# Patient Record
Sex: FEMALE | Race: WHITE
[De-identification: names, ages, dates, MRNs, and addresses within clinical notes are randomized per-mention and may not be internally consistent; named-entity substitution may affect disease eponyms.]

---

## 2016-12-29 NOTE — DIAGNOSTIC IMAGING REPORT
Indication: PAIN



Technique: One view of the chest



Comparison: none



Findings: Inspiration is suboptimal There is mild generalized interstitial

prominence. Patient is rotated to the right. No focal airspace consolidation. Heart

size is borderline enlarged. There are degenerative changes of the right shoulder



Impression: Bilateral mild interstitial disease, acuity indeterminate. Correlate

with clinical findings



Other findings as noted

## 2016-12-29 NOTE — EMERGENCY ROOM REPORT
History of Present Illness


General


Chief Complaint:  Multiple Trauma/Fall


Source:  Caregiver





Present Illness


HPI


83 YO F DRAGAN, sent by PMD Dr Kaplan for failure to thrive and weakness. 

Patient's daughter endorses increased difficulty caring for mother - has ?

dementia - is weak, cant ambulate for "some time." States they were trying to 

change her and she slid off couch but didnt fall hard or hurt anything on the 

fall.  Denies any pain now.  Patient's daughter also endorses decreased appetite

, not eating/drinking normally.  Patient's  has "broken leg" and its 

been difficult for daughter to care for both of them at her house.  patient 

also refusing to take her medication for HTN, thyroid, "memory pil;l."





I disagree with RN triage note that patient is here for "Fall" with back pain.  

Dr Kaplan called ED yesterday to let us know he was sending patient for 

admission for failure to thrive and weakness.


Allergies:  


Coded Allergies:  


     No Known Allergies (Unverified , 12/29/16)





Patient History


Past Medical History:  other - HTN, "thyroid problem," ?dementia


Past Surgical History:  none


Pertinent Family History:  none


Social History:  Denies: alcohol use, drug use, smoking


Pregnant Now:  No


Immunizations:  UTD





Nursing Documentation-PMH


Hx Cardiac Problems:  No


Hx Hypertension:  Yes


Hx Pacemaker:  No


Hx Asthma:  No


Hx COPD:  No


Hx Diabetes:  No


Hx Cancer:  No


Hx Gastrointestinal Problems:  No


History Of Psychiatric Problem:  No


Hx Neurological Problems:  Yes - thyroid


Hx Cerebrovascular Accident:  No


Hx Seizures:  No





Review of Systems


All Other Systems:  negative except mentioned in HPI





Physical Exam





Vital Signs








  Date Time  Temp Pulse Resp B/P Pulse Ox O2 Delivery O2 Flow Rate FiO2


 


12/29/16 08:58 97.9 66 16 126/80 98 Room Air  








Sp02 EP Interpretation:  reviewed, normal


General Appearance:  normal inspection, well appearing, no apparent distress, 

alert, GCS 15, non-toxic, other - Elderly appearing, calm cooperative


Head:  normocephalic, atraumatic


Eyes:  bilateral eye EOMI, bilateral eye PERRL


ENT:  normal ENT inspection, hearing grossly normal, normal voice


Neck:  normal inspection, full range of motion, supple, no bony tend


Respiratory:  normal inspection, lungs clear, normal breath sounds, no 

respiratory distress, no retraction, no wheezing


Cardiovascular #1:  regular rate, rhythm, no edema


Gastrointestinal:  normal inspection, normal bowel sounds, non tender, soft, no 

guarding, no hernia


Genitourinary:  no CVA tenderness


Musculoskeletal:  normal inspection, back normal, normal range of motion, non-

tender, no calf tenderness, pelvis stable, Zoe's Sign negative


Neurologic:  normal inspection, alert, oriented x3, responsive, CNs III-XII nml 

as tested, speech normal, other - Weakness, 2/5 strength in lower extremities. 

Sensation intact.


Psychiatric:  normal inspection, judgement/insight normal, mood/affect normal


Skin:  normal inspection, normal color, no rash





Medical Decision Making


Medicare Attestation





I Cheyenne Zamora MD hereby attest that the medical record entry for date of 

service, 12/5/16 accurately reflects signatures/notations that I made in my 

capacity as MD when I treated/diagnosed the above listed Medicare beneficiary. 

I attest that this information is true, accurate and complete to the best of my 

knowledge. I understand that any falsification, omission, or concealment of 

material fact may subject me to administrative, civil, or criminal liability. 

This patient warrants hospital admission for extreme of age and has a condition 

that cannot be treated as outpatient.


Diagnostic Impression:  


 Primary Impression:  


 Failure to thrive


 Qualified Codes:  R62.7 - Adult failure to thrive


 Additional Impression:  


 Weakness


ER Course


83 YO F with failure to thrive, chronic weakness.  Non compliance with home 

medication.  VSS.  Afebrile.


DDx Metabolic abnormality, infection, CVA, AMI, dementia


PLAN


Evaluate for medical cause of weakness with labs, CXR/UA, CT head


Admit for weakness to Dr Kaplan


EKG Diagnostic Results


Rate:  normal


Rhythm:  NSR


ST Segments:  no acute changes


ASA given to the pt in ED:  No





Rhythm Strip Diag. Results


EP Interpretation:  yes


Rate:  68


Rhythm:  NSR, no PVC's, no ectopy





Chest X-Ray Diagnostic Results


EP Interpretation:  Yes


Findings:  no consolidation, no effusion, no pneumothorax, no acute 

cardiopulmonary disease, other - bilateral interstitial disease


Number of Views:  1


Reevaluation Time:  10:22





Last Vital Signs








  Date Time  Temp Pulse Resp B/P Pulse Ox O2 Delivery O2 Flow Rate FiO2


 


12/29/16 08:58 97.9 66 16 126/80 98 Room Air  








Status:  improved


Reevaluation Impression


labs: Mild leuks.  UA no UTI.  CMP normal.  Troponin 0.


CXR: No acute PNA


EKG is NSR, no ischemia


CT head: age related changes, no CVA, mass, mass effect


A: Endorsed to Dr Kaplan for admission for failure to thrive/weakness at 1022am.


Disposition:  ADMITTED AS INPATIENT


Condition:  Serious











CHEYENNE ZAMORA M.D. Dec 29, 2016 09:32

## 2016-12-29 NOTE — HISTORY AND PHYSICAL REPORT
DATE OF ADMISSION:  12/29/2016



CHIEF COMPLAINT:  Frequent falls, progressive dementia, and failure

to thrive.



HISTORY OF PRESENT ILLNESS:  This is an 82-year-old [place] female,

who was brought in by the daughter to the emergency room.  The patient

lives with her daughter and she has been unable to take care of her

because she falls very frequently.  She has progressive dementia and not

functioning well at home.  The patient has also some elevated white count

with no obvious source of infection.



PAST MEDICAL HISTORY:  As mentioned, the patient has history of

dementia of the Alzheimer's type.  The patient has history of hypertension

and hypothyroidism.



MEDICATIONS:  Reviewed in the EMR.



ALLERGIES:  No known drug allergies.



SOCIAL HISTORY:  No history of smoking or alcohol abuse.



REVIEW OF SYSTEMS:  As above.



PHYSICAL EXAMINATION:

GENERAL:  The patient is an elderly female, in no acute distress.

VITAL SIGNS:  Blood pressure is 130/88, pulse 63, respirations 14,

and temperature 97 degrees.

HEENT:  Pink conjunctivae.  Anicteric sclerae.

NECK:  Supple.

LUNGS:  Clear to auscultation.

HEART:  S1 and S2 without murmurs or rubs.

ABDOMEN:  Soft and nontender.

EXTREMITIES:  No cyanosis or edema.



LABORATORY FINDINGS:  The CBC shows a WBC of 12.8, hematocrit is

40.2, hemoglobin is 13, and platelet is 285,000.  The chemistry panel

shows a serum sodium 141, potassium 4.3, chloride 97, CO2 29, BUN 16, and

creatinine 0.7.  Blood sugar is 105.  AST is 26 and ALT 16.  Albumin is 4.

Troponin was negative.  UA was negative except 1+ protein.



ASSESSMENT:  This is an 82-year-old female, who is admitted with

failure to thrive, frequent falls, and change in mental status.  The

patient has hypothyroidism.  It is unclear if her progressive dementia is

related to her hypothyroidism or just from Alzheimer.



PLAN:  The patient will be admitted.  We will check the thyroid

function panel.  We will continue her current medications and physical

therapy will be ordered.  The patient will eventually need to go to a

skilled nursing facility.









  ______________________________________________

  Guido Kaplan M.D.





DR:  STU

D:  12/29/2016 12:31

T:  12/29/2016 21:51

JOB#:  [id number]

CC:



MICHAEL

## 2016-12-29 NOTE — HISTORY & PHYSICAL
History and Physical


History & Physicial


.[facility] # [id number]











ROLAND MONCADA Dec 29, 2016 12:29

## 2016-12-30 NOTE — CARDIOLOGY REPORT
--------------- APPROVED REPORT --------------





EKG Measurement

Heart [id number]

SC [id number]

[id number]

[id number]

[id number]





Normal sinus rhythm

Moderate voltage criteria for LVH, may be normal variant

Borderline ECG

## 2016-12-30 NOTE — GENERAL PROGRESS NOTE
Assessment/Plan


Problem List:  


(1) Failure to thrive


SNOMED:  30822808


Qualifiers:  


   Qualified Codes:  R62.7 - Adult failure to thrive


(2) Alzheimer's dementia


ICD Codes:  G30.9 - Alzheimer's disease, unspecified


SNOMED:  22757944


Qualifiers:  


   


(3) HTN (hypertension)


ICD Codes:  I10 - Essential (primary) hypertension


SNOMED:  03792608


(4) Hypothyroidism


ICD Codes:  E03.9 - Hypothyroidism, unspecified


SNOMED:  98204556


Assessment/Plan


Increase TSH


PT


Discussed with RN





Subjective


Allergies:  


Coded Allergies:  


     No Known Allergies (Unverified , 12/29/16)


Subjective


In NAD





Objective





Last 24 Hour Vital Signs








  Date Time  Temp Pulse Resp B/P Pulse Ox O2 Delivery O2 Flow Rate FiO2


 


12/30/16 16:00 99.0 83 16 127/68 93 Room Air  


 


12/30/16 12:00 98.1 82 20 125/65 91 Room Air  


 


12/30/16 09:00  79  114/54    


 


12/30/16 08:00 98.2 79 20 114/54 92 Room Air  


 


12/30/16 04:00 97.2 57 17 136/59 95 Room Air  


 


12/30/16 00:00 97.9 87 20 139/72 91 Room Air  


 


12/29/16 19:00 98.0 73 18 120/77 97 Room Air  

















Intake and Output  


 


 12/29/16 12/30/16





 19:00 07:00


 


Intake Total 240 ml 500 ml


 


Output Total 150 ml 


 


Balance 90 ml 500 ml


 


  


 


Intake Oral 240 ml 500 ml


 


Output Stool Total 150 ml 


 


# Voids 1 6








Laboratory Tests


12/30/16 07:15: 


Sodium Level 140, Potassium Level 3.8, Chloride Level 99, Carbon Dioxide Level 

27, Anion Gap 14, Blood Urea Nitrogen 15, Creatinine 0.7, Estimat Glomerular 

Filtration Rate , Glucose Level 114H, Hemoglobin A1c 5.4, Calcium Level 8.8, 

Total Bilirubin 0.6, Aspartate Amino Transf (AST/SGOT) 18, Alanine 

Aminotransferase (ALT/SGPT) 14, Alkaline Phosphatase 86, Total Protein 6.8, 

Albumin 3.8, Globulin 3.0, Albumin/Globulin Ratio 1.2, Triglycerides Level 79, 

Cholesterol Level 155, LDL Cholesterol 94, HDL Cholesterol 45, Cholesterol/HDL 

Ratio 3.4, Thyroid Stimulating Hormone (TSH) 20.950H


Height (Feet):  5


Height (Inches):  4.00


Weight (Pounds):  196


Cardiovascular:  normal rate


Respiratory/Chest:  lungs clear


Edema:  no edema noted Generalized











ROLAND MONCADA Dec 30, 2016 16:47

## 2016-12-31 NOTE — GENERAL PROGRESS NOTE
Assessment/Plan


Problem List:  


(1) Failure to thrive


SNOMED:  17230103


Qualifiers:  


   Qualified Codes:  R62.7 - Adult failure to thrive


(2) Alzheimer's dementia


ICD Codes:  G30.9 - Alzheimer's disease, unspecified


SNOMED:  90348245


Qualifiers:  


   


(3) HTN (hypertension)


ICD Codes:  I10 - Essential (primary) hypertension


SNOMED:  15662657


(4) Hypothyroidism


ICD Codes:  E03.9 - Hypothyroidism, unspecified


SNOMED:  07131406


Assessment/Plan


Increase TSH


PT


Discussed with RN





Subjective


Allergies:  


Coded Allergies:  


     No Known Allergies (Unverified , 12/29/16)


Subjective


In NAD





Objective





Last 24 Hour Vital Signs








  Date Time  Temp Pulse Resp B/P Pulse Ox O2 Delivery O2 Flow Rate FiO2


 


12/31/16 16:00 96.4 88 20 108/76 100 Room Air  


 


12/31/16 12:00 98.4 75 18 133/57 94 Room Air  


 


12/31/16 10:15  86  116/52    


 


12/31/16 08:00 97.2 73 18 150/71 95 Room Air  


 


12/31/16 04:00 98.1 71 20 126/63 92 Room Air  


 


12/31/16 00:00 99.1 84 20 129/59 92 Room Air  


 


12/30/16 20:00 98.2 79 17 130/69 94 Room Air  

















Intake and Output  


 


 12/30/16 12/31/16





 19:00 07:00


 


Intake Total 240 ml 


 


Balance 240 ml 


 


  


 


Intake Oral 240 ml 


 


# Voids 2 2








Height (Feet):  5


Height (Inches):  4.00


Weight (Pounds):  196


Cardiovascular:  normal rate


Respiratory/Chest:  lungs clear











ROLAND MONCADA Dec 31, 2016 18:15

## 2016-12-31 NOTE — DIAGNOSTIC IMAGING REPORT
Indications: Pain status post fall



Technique: Spiral acquisitions obtained through the brain. Angled axial and coronal

5 x 5 mm slices were reconstructed. Total dose length product 1339 mGycm.  CTDI

vol(s) 70 mGy



Comparison: None



Findings: There is age-related enlargement of the ventricles and extra axial CSF

spaces. There is a calcification within the right side of the cerebellum. There 

is

minimal periventricular deep white matter chronic ischemic change. No acute

hemorrhage or edema. No mass effect or midline shift. Intact calvarium. Visualized

orbits and sinuses are unremarkable



Impression: Negative for acute intracranial bleed or mass effect



Chronic and age-related changes, as described



Calcification within the right cerebellar hemisphere, may be physiologic dentate

nucleus calcification, versus process such as old cysticercosis.











The CT scanner at [facility] is accredited by the American College 

of

Radiology and the scans are performed using protocols designed to limit 

radiation

exposure to as low as reasonably achievable to attain images of sufficient

resolution adequate for diagnostic evaluation.

## 2017-01-02 NOTE — DISCHARGE SUMMARY
DATE OF ADMISSION:  12/29/2016



DATE OF DISCHARGE:  01/01/2017



CHIEF COMPLAINT:  Failure to thrive, change in mental status and

progressive dementia.



HISTORY OF PRESENT ILLNESS:  This is an 82-year-old female, who is

admitted with above diagnosis.  She had also history of hyponatremia.



HOSPITAL COURSE:  The patient's TSH was checked and was more than 20.

The patient was found to be hypothyroid.  She was already on thyroid and

the dose was changed from 120 mcg to 170 mcg a day.  The patient had also

history of hypertension, although blood pressure was controlled.

Eventually, she was sent to [facility] for rehabilitation.



DISCHARGE DIAGNOSES:

1. Failure to thrive.

2. Progressive Alzheimer's dementia.

3. Hypertension.

4. Hypothyroidism.



DISCHARGE MEDICATION:  Please refer to discharge medication list.









  ______________________________________________

  Guido Kaplan M.D.





DR:  CHAVEZ

D:  01/01/2017 11:21

T:  01/02/2017 16:49

JOB#:  [id number]

CC:

## 2020-02-06 ENCOUNTER — HOSPITAL ENCOUNTER (INPATIENT)
Dept: HOSPITAL 72 - EMR | Age: 85
LOS: 5 days | Discharge: NURSING, REHAB OR LONG-TERM CARE | DRG: 193 | End: 2020-02-11
Payer: MEDICARE

## 2020-02-06 VITALS — DIASTOLIC BLOOD PRESSURE: 54 MMHG | SYSTOLIC BLOOD PRESSURE: 124 MMHG

## 2020-02-06 VITALS — DIASTOLIC BLOOD PRESSURE: 62 MMHG | SYSTOLIC BLOOD PRESSURE: 116 MMHG

## 2020-02-06 VITALS — DIASTOLIC BLOOD PRESSURE: 75 MMHG | SYSTOLIC BLOOD PRESSURE: 126 MMHG

## 2020-02-06 VITALS — DIASTOLIC BLOOD PRESSURE: 58 MMHG | SYSTOLIC BLOOD PRESSURE: 110 MMHG

## 2020-02-06 VITALS — WEIGHT: 185 LBS | HEIGHT: 64 IN | BODY MASS INDEX: 31.58 KG/M2

## 2020-02-06 VITALS — SYSTOLIC BLOOD PRESSURE: 110 MMHG | DIASTOLIC BLOOD PRESSURE: 63 MMHG

## 2020-02-06 DIAGNOSIS — E87.0: ICD-10-CM

## 2020-02-06 DIAGNOSIS — F02.80: ICD-10-CM

## 2020-02-06 DIAGNOSIS — Z88.0: ICD-10-CM

## 2020-02-06 DIAGNOSIS — J44.0: ICD-10-CM

## 2020-02-06 DIAGNOSIS — J44.1: ICD-10-CM

## 2020-02-06 DIAGNOSIS — I11.0: ICD-10-CM

## 2020-02-06 DIAGNOSIS — J96.01: ICD-10-CM

## 2020-02-06 DIAGNOSIS — E78.5: ICD-10-CM

## 2020-02-06 DIAGNOSIS — L89.46: ICD-10-CM

## 2020-02-06 DIAGNOSIS — R62.7: ICD-10-CM

## 2020-02-06 DIAGNOSIS — G30.9: ICD-10-CM

## 2020-02-06 DIAGNOSIS — I50.30: ICD-10-CM

## 2020-02-06 DIAGNOSIS — J18.9: Primary | ICD-10-CM

## 2020-02-06 DIAGNOSIS — E03.9: ICD-10-CM

## 2020-02-06 DIAGNOSIS — Z79.82: ICD-10-CM

## 2020-02-06 LAB
ADD MANUAL DIFF: NO
ALBUMIN SERPL-MCNC: 3.2 G/DL (ref 3.4–5)
ALBUMIN/GLOB SERPL: 0.8 {RATIO} (ref 1–2.7)
ALP SERPL-CCNC: 78 U/L (ref 46–116)
ALT SERPL-CCNC: 38 U/L (ref 12–78)
ANION GAP SERPL CALC-SCNC: 9 MMOL/L (ref 5–15)
APPEARANCE UR: CLEAR
APTT PPP: (no result) S
AST SERPL-CCNC: 18 U/L (ref 15–37)
BASOPHILS NFR BLD AUTO: 1 % (ref 0–2)
BILIRUB SERPL-MCNC: 0.4 MG/DL (ref 0.2–1)
BUN SERPL-MCNC: 20 MG/DL (ref 7–18)
CALCIUM SERPL-MCNC: 9.1 MG/DL (ref 8.5–10.1)
CHLORIDE SERPL-SCNC: 104 MMOL/L (ref 98–107)
CK MB SERPL-MCNC: 1 NG/ML (ref 0–3.6)
CK SERPL-CCNC: 29 U/L (ref 26–308)
CO2 SERPL-SCNC: 32 MMOL/L (ref 21–32)
CREAT SERPL-MCNC: 0.9 MG/DL (ref 0.55–1.3)
EOSINOPHIL NFR BLD AUTO: 1.6 % (ref 0–3)
ERYTHROCYTE [DISTWIDTH] IN BLOOD BY AUTOMATED COUNT: 12.9 % (ref 11.6–14.8)
GLOBULIN SER-MCNC: 3.9 G/DL
GLUCOSE UR STRIP-MCNC: NEGATIVE MG/DL
HCT VFR BLD CALC: 39.9 % (ref 37–47)
HGB BLD-MCNC: 13.4 G/DL (ref 12–16)
KETONES UR QL STRIP: NEGATIVE
LEUKOCYTE ESTERASE UR QL STRIP: NEGATIVE
LYMPHOCYTES NFR BLD AUTO: 31 % (ref 20–45)
MCV RBC AUTO: 88 FL (ref 80–99)
MONOCYTES NFR BLD AUTO: 8.4 % (ref 1–10)
NEUTROPHILS NFR BLD AUTO: 57.9 % (ref 45–75)
NITRITE UR QL STRIP: NEGATIVE
PH UR STRIP: 6 [PH] (ref 4.5–8)
PLATELET # BLD: 180 K/UL (ref 150–450)
POTASSIUM SERPL-SCNC: 3.8 MMOL/L (ref 3.5–5.1)
PROT UR QL STRIP: NEGATIVE
RBC # BLD AUTO: 4.55 M/UL (ref 4.2–5.4)
SODIUM SERPL-SCNC: 145 MMOL/L (ref 136–145)
SP GR UR STRIP: 1 (ref 1–1.03)
UROBILINOGEN UR-MCNC: NORMAL MG/DL (ref 0–1)
WBC # BLD AUTO: 17.1 K/UL (ref 4.8–10.8)

## 2020-02-06 PROCEDURE — 71045 X-RAY EXAM CHEST 1 VIEW: CPT

## 2020-02-06 PROCEDURE — 80061 LIPID PANEL: CPT

## 2020-02-06 PROCEDURE — 80202 ASSAY OF VANCOMYCIN: CPT

## 2020-02-06 PROCEDURE — 99285 EMERGENCY DEPT VISIT HI MDM: CPT

## 2020-02-06 PROCEDURE — 85025 COMPLETE CBC W/AUTO DIFF WBC: CPT

## 2020-02-06 PROCEDURE — 82553 CREATINE MB FRACTION: CPT

## 2020-02-06 PROCEDURE — 93005 ELECTROCARDIOGRAM TRACING: CPT

## 2020-02-06 PROCEDURE — 96375 TX/PRO/DX INJ NEW DRUG ADDON: CPT

## 2020-02-06 PROCEDURE — 83880 ASSAY OF NATRIURETIC PEPTIDE: CPT

## 2020-02-06 PROCEDURE — 94664 DEMO&/EVAL PT USE INHALER: CPT

## 2020-02-06 PROCEDURE — 80048 BASIC METABOLIC PNL TOTAL CA: CPT

## 2020-02-06 PROCEDURE — 96365 THER/PROPH/DIAG IV INF INIT: CPT

## 2020-02-06 PROCEDURE — 82550 ASSAY OF CK (CPK): CPT

## 2020-02-06 PROCEDURE — 80053 COMPREHEN METABOLIC PANEL: CPT

## 2020-02-06 PROCEDURE — 84443 ASSAY THYROID STIM HORMONE: CPT

## 2020-02-06 PROCEDURE — 83605 ASSAY OF LACTIC ACID: CPT

## 2020-02-06 PROCEDURE — 85007 BL SMEAR W/DIFF WBC COUNT: CPT

## 2020-02-06 PROCEDURE — 82803 BLOOD GASES ANY COMBINATION: CPT

## 2020-02-06 PROCEDURE — 96366 THER/PROPH/DIAG IV INF ADDON: CPT

## 2020-02-06 PROCEDURE — 36600 WITHDRAWAL OF ARTERIAL BLOOD: CPT

## 2020-02-06 PROCEDURE — 87040 BLOOD CULTURE FOR BACTERIA: CPT

## 2020-02-06 PROCEDURE — 81003 URINALYSIS AUTO W/O SCOPE: CPT

## 2020-02-06 PROCEDURE — 84484 ASSAY OF TROPONIN QUANT: CPT

## 2020-02-06 PROCEDURE — 94640 AIRWAY INHALATION TREATMENT: CPT

## 2020-02-06 PROCEDURE — 87081 CULTURE SCREEN ONLY: CPT

## 2020-02-06 PROCEDURE — 86710 INFLUENZA VIRUS ANTIBODY: CPT

## 2020-02-06 PROCEDURE — 36415 COLL VENOUS BLD VENIPUNCTURE: CPT

## 2020-02-06 PROCEDURE — 83036 HEMOGLOBIN GLYCOSYLATED A1C: CPT

## 2020-02-06 RX ADMIN — METHYLPREDNISOLONE SODIUM SUCCINATE SCH MG: 40 INJECTION, POWDER, LYOPHILIZED, FOR SOLUTION INTRAMUSCULAR; INTRAVENOUS at 20:58

## 2020-02-06 RX ADMIN — METRONIDAZOLE SCH MG: 500 TABLET ORAL at 17:41

## 2020-02-06 RX ADMIN — VANCOMYCIN HYDROCHLORIDE SCH MLS/HR: 500 INJECTION, POWDER, LYOPHILIZED, FOR SOLUTION INTRAVENOUS at 21:48

## 2020-02-06 RX ADMIN — HEPARIN SODIUM SCH UNITS: 5000 INJECTION INTRAVENOUS; SUBCUTANEOUS at 21:01

## 2020-02-06 NOTE — HISTORY & PHYSICAL
History and Physical


History & Physicial


HP dictated # [id number]











Guido Kaplan MD Feb 6, 2020 14:30

## 2020-02-06 NOTE — EMERGENCY ROOM REPORT
History of Present Illness


General


Chief Complaint:  Dyspnea/Respdistress


Source:  Medical Record, EMS





Present Illness


HPI


This patient is brought in from a skilled nursing facility.  She has a history 

of COPD.  The patient was recently discharged from [facility] 

for COPD.  She presents with respiratory distress, hypoxemia and wheezing.  The 

patient is nonverbal at baseline.  This report is obtained by EMS from the 

skilled nursing facility.


Allergies:  


Coded Allergies:  


     PENICILLINS (Verified  Allergy, Unknown, 2/6/20)





Patient History


Past Medical History:  see triage record, HTN, CHF, COPD, dementia


Social History:  Denies: smoking, alcohol use, drug use


Pregnant Now:  No


Reviewed Nursing Documentation:  PMH: Agreed; PSxH: Agreed





Nursing Documentation-PMH


Hx Cardiac Problems:  Yes - HTN


Hx Hypertension:  Yes


Hx Pacemaker:  No


Hx Asthma:  No


Hx COPD:  No


Hx Diabetes:  No


Hx Cancer:  No


Hx Gastrointestinal Problems:  Yes


Hx Neurological Problems:  Yes


Hx Cerebrovascular Accident:  No


Hx Dementia:  Yes


Hx Seizures:  No





Review of Systems


All Other Systems:  negative except mentioned in HPI





Physical Exam





Vital Signs








  Date Time  Temp Pulse Resp B/P (MAP) Pulse Ox O2 Delivery O2 Flow Rate FiO2


 


2/6/20 11:04 97.9 127 22 116/62 (80) 98   


 


2/6/20 11:38      Nasal Cannula 2.0 28








Sp02 EP Interpretation:  reviewed, normal


General Appearance:  no apparent distress, alert, GCS 15, non-toxic


Head:  normocephalic, atraumatic


Eyes:  bilateral eye normal inspection, bilateral eye PERRL


ENT:  hearing grossly normal, normal pharynx, no angioedema, normal voice


Neck:  full range of motion, supple/symm/no masses


Respiratory:  chest non-tender, respiratory distress, wheezing, expiration


Cardiovascular #1:  no edema, tachycardia


Gastrointestinal:  normal bowel sounds, non tender, soft, non-distended, no 

guarding, no rebound


Rectal:  deferred


Musculoskeletal:  back normal, normal range of motion, gait/station normal, non-

tender


Neurologic:  alert, responsive


Psychiatric:  mood/affect normal, no suicidal/homicidal ideation


Skin:  other - See RN skin exam





Medical Decision Making


Diagnostic Impression:  


 Primary Impression:  


 COPD exacerbation


 Additional Impression:  


 Lactic acid acidosis


ER Course


This patient presents with COPD exacerbation.  The patient's white blood cell 

count is elevated, however, there is no evidence of pneumonia at this time.  

The patient had been on steroids at [facility] and this may be 

a steroid effect.  She also has lactic acidosis.  However, this also appears to 

be chronic and may be a medication effect.  The patient presented in 

respiratory distress with significant wheezing.  She was given aggressive 

nebulizer treatments with albuterol and Atrovent.  She is given IV Solu-Medrol 

and placed on BiPAP to decrease her work of breathing.  The patient was able to 

be weaned off of BiPAP during her ED course after getting aggressive pulmonary 

hygiene.  The patient is DO NOT INTUBATE/DO NOT RESUSCITATE.  She is admitted 

to telemetry for ongoing pulmonary hygiene monitoring and treatment.





Laboratory Tests








Test


  2/6/20


11:45 2/6/20


12:30 2/6/20


13:37 2/6/20


14:18


 


White Blood Count


  17.1 K/UL


(4.8-10.8)  H 


  


  


 


 


Red Blood Count


  4.55 M/UL


(4.20-5.40) 


  


  


 


 


Hemoglobin


  13.4 G/DL


(12.0-16.0) 


  


  


 


 


Hematocrit


  39.9 %


(37.0-47.0) 


  


  


 


 


Mean Corpuscular Volume 88 FL (80-99)     


 


Mean Corpuscular Hemoglobin


  29.5 PG


(27.0-31.0) 


  


  


 


 


Mean Corpuscular Hemoglobin


Concent 33.6 G/DL


(32.0-36.0) 


  


  


 


 


Red Cell Distribution Width


  12.9 %


(11.6-14.8) 


  


  


 


 


Platelet Count


  180 K/UL


(150-450) 


  


  


 


 


Mean Platelet Volume


  6.8 FL


(6.5-10.1) 


  


  


 


 


Neutrophils (%) (Auto)


  57.9 %


(45.0-75.0) 


  


  


 


 


Lymphocytes (%) (Auto)


  31.0 %


(20.0-45.0) 


  


  


 


 


Monocytes (%) (Auto)


  8.4 %


(1.0-10.0) 


  


  


 


 


Eosinophils (%) (Auto)


  1.6 %


(0.0-3.0) 


  


  


 


 


Basophils (%) (Auto)


  1.0 %


(0.0-2.0) 


  


  


 


 


Sodium Level


  145 MMOL/L


(136-145) 


  


  


 


 


Potassium Level


  3.8 MMOL/L


(3.5-5.1) 


  


  


 


 


Chloride Level


  104 MMOL/L


() 


  


  


 


 


Carbon Dioxide Level


  32 MMOL/L


(21-32) 


  


  


 


 


Anion Gap


  9 mmol/L


(5-15) 


  


  


 


 


Blood Urea Nitrogen


  20 mg/dL


(7-18)  H 


  


  


 


 


Creatinine


  0.9 MG/DL


(0.55-1.30) 


  


  


 


 


Estimate Glomerular


Filtration Rate  mL/min (>60)  


  


  


  


 


 


Glucose Level


  149 MG/DL


()  H 


  


  


 


 


Lactic Acid Level


  4.40 mmol/L


(0.4-2.0)  H 


  


  5.20 mmol/L


(0.66-2.22)  H


 


Calcium Level


  9.1 MG/DL


(8.5-10.1) 


  


  


 


 


Total Bilirubin


  0.4 MG/DL


(0.2-1.0) 


  


  


 


 


Aspartate Amino Transferase


(AST) 18 U/L (15-37)


  


  


  


 


 


Alanine Aminotransferase (ALT)


  38 U/L (12-78)


  


  


  


 


 


Alkaline Phosphatase


  78 U/L


() 


  


  


 


 


Total Creatine Kinase


  29 U/L


() 


  


  


 


 


Creatine Kinase MB


  1.0 NG/ML


(0.0-3.6) 


  


  


 


 


Creatine Kinase MB Relative


Index 3.4  


  


  


  


 


 


Troponin I


  0.053 ng/mL


(0.000-0.056) 


  


  


 


 


Total Protein


  7.1 G/DL


(6.4-8.2) 


  


  


 


 


Albumin


  3.2 G/DL


(3.4-5.0)  L 


  


  


 


 


Globulin 3.9 g/dL     


 


Albumin/Globulin Ratio


  0.8 (1.0-2.7)


L 


  


  


 


 


Urine Color  Pale yellow    


 


Urine Appearance  Clear    


 


Urine pH  6 (4.5-8.0)    


 


Urine Specific Gravity


  


  1.005


(1.005-1.035) 


  


 


 


Urine Protein


  


  Negative


(NEGATIVE) 


  


 


 


Urine Glucose (UA)


  


  Negative


(NEGATIVE) 


  


 


 


Urine Ketones


  


  Negative


(NEGATIVE) 


  


 


 


Urine Blood


  


  Negative


(NEGATIVE) 


  


 


 


Urine Nitrite


  


  Negative


(NEGATIVE) 


  


 


 


Urine Bilirubin


  


  Negative


(NEGATIVE) 


  


 


 


Urine Urobilinogen


  


  Normal MG/DL


(0.0-1.0) 


  


 


 


Urine Leukocyte Esterase


  


  Negative


(NEGATIVE) 


  


 


 


Arterial Blood pH


  


  


  7.367


(7.350-7.450) 


 


 


Arterial Blood Partial


Pressure CO2 


  


  41.0 mmHg


(35.0-45.0) 


 


 


Arterial Blood Partial


Pressure O2 


  


  163.0 mmHg


(75.0-100.0)  H 


 


 


Arterial Blood HCO3


  


  


  23.0 mmol/L


(22.0-26.0) 


 


 


Arterial Blood Oxygen


Saturation 


  


  98.3 %


() 


 


 


Arterial Blood Base Excess   -2.2 (-2-2)  L 


 


Jose D Test   Positive   








Microbiology








 Date/Time


Source Procedure


Growth Status


 


 


 2/6/20 12:00


Nasal Nares - Final Complete


 


 2/6/20 12:00


Nasal Nares - Final Complete








EKG Diagnostic Results


Rate:  tachycardiac


Rhythm:  other - S.tachycardia


ST Segments:  no acute changes





Rhythm Strip Diag. Results


EP Interpretation:  yes


Rate:  110's


Rhythm:  no PVC's, no ectopy, other


Other Impression


S.tachycardia





Chest X-Ray Diagnostic Results


Chest X-Ray Diagnostic Results :  


   Chest X-Ray Ordered:  Yes


   # of Views/Limited/Complete:  1 View


   Indication:  Shortness of Breath


   EP Interpretation:  Yes


   Interpretation:  no consolidation, no effusion, no pneumothorax, no acute 

cardiopulmonary disease


   Impression:  No acute disease


   Electronically Signed by:  Rhoda Allen DO





Last Vital Signs








  Date Time  Temp Pulse Resp B/P (MAP) Pulse Ox O2 Delivery O2 Flow Rate FiO2


 


2/6/20 13:30  98 25  97 Facial  30





  107 25  98 Bi-Pap  30


 


2/6/20 11:39       2.0 


 


2/6/20 11:30 97.9   116/62    








Disposition:  ADMITTED AS INPATIENT


Condition:  Serious


Referrals:  


Guido Kaplan MD (PCP)











Rhoda Allen DO Feb 6, 2020 15:45

## 2020-02-06 NOTE — NUR
RESPIRATORY NOTE:



Per Dr. Billy, remove pt on bipap. Placed pt on 2LPM NC. Keep SaO2>92%. Pt's 
current SaO2 is 98%. RN Ronnie harvey.

## 2020-02-06 NOTE — NUR
ED Nurse Note:



pt transferred with 1 ER tech and 1 RN in stable condition. O2 sat at 96% in 
room air.

## 2020-02-06 NOTE — NUR
ED Nurse Note:



pt brought in by ambulance from [facility] due to SOB started this morning. 
upon arrival O2 sat at 93% in room air but SOB noted. pt aao x1 and bedridden. 
calm but fatigued. pt is in gown and on cardiac monitor. RT and ERMD at 
bedside.

## 2020-02-06 NOTE — NUR
NURSE NOTES:

Received pt from ER MICHAEL MORRELL, Pt is awake and nonverbal, pt has SOB with NC 2LMP, pt is on 
continues heart monitoring. Pt has intact iv access R and L wrist SL. Dr ZHANG is aware 
about admission and placed admission orders. Dr MONCADA is aware about WBC 17.1, ABG results, 
and other lab results and V/S, MD will F/U. RN called SNF and asked MICHAEL MAHER regarding pt. 
Pt has area of concern look like DTI, pictures took and uploaded. wound nurse is aware. All 
needs attended, bed is locked and is in the lowest position, call light within easy reach. 
will continue to monitor.

## 2020-02-06 NOTE — NUR
RESPIRATORY NOTE:



Pt placed on bipap 12/5 Rate of 14 30%, SaO2 97%. Bilateral wheeze/diminished 
breathsounds. ABG drawn. treatment given. RN aware.

## 2020-02-06 NOTE — CONSULTATION
History of Present Illness


General


Date patient seen:  Feb 6, 2020


Time patient seen:  14:24


Chief Complaint:  Dyspnea/Respdistress


Referring physician:  Guido Kaplan MD


Reason for Consultation:  SOB/RF





Present Illness


HPI


85 F NHR h/o dementia, COPD, CHF with mild DD only recently discharged from 

[facility] BIB EMS with SOB, wheezing.  She had inc WOB so was placed on BiPAP by the 

ED physician.  + cough + wheezing + SOB no FC no CP


She passed a swallow eval @ [facility] last year.  W/U thus far concerning for 

leukocytosis and lactic acidosis, CXR with few scattered b ll predominant 

opacities.


Allergies:  


Coded Allergies:  


     PENICILLINS (Verified  Allergy, Unknown, 2/6/20)





Medication History


Scheduled


Amlodipine Besylate* (Amlodipine Besylate*), 5 MG ORAL DAILY, (Reported)


Clonazepam* (Klonopin*), 0.5 MG ORAL BID, (Reported)


Levothyroxine Sodium* (Levothyroxine Sodium*), 75 MCG ORAL DAILY@0630


Levothyroxine Sodium* (Levothyroxine Sodium*), 100 MCG ORAL DAILY@0630


Memantine HCl/Donepezil HCl (Namzaric 28 mg-10 mg Capsule), 1 EACH PO BEDTIME, (

Reported)


Simvastatin (Zocor), 20 MG ORAL BEDTIME, (Reported)





Scheduled PRN


Zolpidem Tartrate* (Ambien*), 10 MG ORAL HS PRN for Insomnia, (Reported)





Patient History


Limited by:  medical condition


History Provided By:  Medical Record, EMS


Healthcare decision maker





Resuscitation status





Advanced Directive on File








Past Medical/Surgical History


Past Medical/Surgical History:  


(1) Failure to thrive


(2) Alzheimer's dementia


(3) Hypothyroidism


(4) HTN (hypertension)





Social History


Social History:  


(1) No known exposure to tobacco smoke


(2) Nursing home resident





Review of Systems


ROS Narrative


Unobtainable





Physical Exam


General Appearance:  lethargic, other - obtunded


Lines, tubes and drains:  peripheral


HEENT:  normocephalic, atraumatic, anicteric, mucous membranes moist


Neck:  normal alignment, other - 8 cm JVD


Respiratory/Chest:  crackles/rales - BiB, expiratory wheezing, inspiratory 

wheezing


Cardiovascular/Chest:  normal peripheral pulses, normal rate, regular rhythm


Abdomen:  normal bowel sounds, non tender, soft, no organomegaly, no mass


Extremities:  other - No CC, trace MARIELA





Last 24 Hour Vital Signs








  Date Time  Temp Pulse Resp B/P (MAP) Pulse Ox O2 Delivery O2 Flow Rate FiO2


 


2/6/20 11:39  118 23   Nasal Cannula 2.0 28


 


2/6/20 11:38  119 21  99 Nasal Cannula 2.0 28





  118 23  97   


 


2/6/20 11:30 97.9 100 22 116/62 98   


 


2/6/20 11:04 97.9 127 22 116/62 (80) 98   











Laboratory Tests








Test


  2/6/20


11:45 2/6/20


12:30 2/6/20


13:37 2/6/20


14:18


 


White Blood Count


  17.1 K/UL


(4.8-10.8)  H 


  


  


 


 


Red Blood Count


  4.55 M/UL


(4.20-5.40) 


  


  


 


 


Hemoglobin


  13.4 G/DL


(12.0-16.0) 


  


  


 


 


Hematocrit


  39.9 %


(37.0-47.0) 


  


  


 


 


Mean Corpuscular Volume 88 FL (80-99)     


 


Mean Corpuscular Hemoglobin


  29.5 PG


(27.0-31.0) 


  


  


 


 


Mean Corpuscular Hemoglobin


Concent 33.6 G/DL


(32.0-36.0) 


  


  


 


 


Red Cell Distribution Width


  12.9 %


(11.6-14.8) 


  


  


 


 


Platelet Count


  180 K/UL


(150-450) 


  


  


 


 


Mean Platelet Volume


  6.8 FL


(6.5-10.1) 


  


  


 


 


Neutrophils (%) (Auto)


  57.9 %


(45.0-75.0) 


  


  


 


 


Lymphocytes (%) (Auto)


  31.0 %


(20.0-45.0) 


  


  


 


 


Monocytes (%) (Auto)


  8.4 %


(1.0-10.0) 


  


  


 


 


Eosinophils (%) (Auto)


  1.6 %


(0.0-3.0) 


  


  


 


 


Basophils (%) (Auto)


  1.0 %


(0.0-2.0) 


  


  


 


 


Sodium Level


  145 MMOL/L


(136-145) 


  


  


 


 


Potassium Level


  3.8 MMOL/L


(3.5-5.1) 


  


  


 


 


Chloride Level


  104 MMOL/L


() 


  


  


 


 


Carbon Dioxide Level


  32 MMOL/L


(21-32) 


  


  


 


 


Anion Gap


  9 mmol/L


(5-15) 


  


  


 


 


Blood Urea Nitrogen


  20 mg/dL


(7-18)  H 


  


  


 


 


Creatinine


  0.9 MG/DL


(0.55-1.30) 


  


  


 


 


Estimat Glomerular Filtration


Rate  mL/min (>60)  


  


  


  


 


 


Glucose Level


  149 MG/DL


()  H 


  


  


 


 


Lactic Acid Level


  4.40 mmol/L


(0.4-2.0)  H 


  


  Pending  


 


 


Calcium Level


  9.1 MG/DL


(8.5-10.1) 


  


  


 


 


Total Bilirubin


  0.4 MG/DL


(0.2-1.0) 


  


  


 


 


Aspartate Amino Transf


(AST/SGOT) 18 U/L (15-37)


  


  


  


 


 


Alanine Aminotransferase


(ALT/SGPT) 38 U/L (12-78)


  


  


  


 


 


Alkaline Phosphatase


  78 U/L


() 


  


  


 


 


Total Creatine Kinase


  29 U/L


() 


  


  


 


 


Creatine Kinase MB


  1.0 NG/ML


(0.0-3.6) 


  


  


 


 


Creatine Kinase MB Relative


Index 3.4  


  


  


  


 


 


Troponin I


  0.053 ng/mL


(0.000-0.056) 


  


  


 


 


Total Protein


  7.1 G/DL


(6.4-8.2) 


  


  


 


 


Albumin


  3.2 G/DL


(3.4-5.0)  L 


  


  


 


 


Globulin 3.9 g/dL     


 


Albumin/Globulin Ratio


  0.8 (1.0-2.7)


L 


  


  


 


 


Urine Color  Pale yellow    


 


Urine Appearance  Clear    


 


Urine pH  6 (4.5-8.0)    


 


Urine Specific Gravity


  


  1.005


(1.005-1.035) 


  


 


 


Urine Protein


  


  Negative


(NEGATIVE) 


  


 


 


Urine Glucose (UA)


  


  Negative


(NEGATIVE) 


  


 


 


Urine Ketones


  


  Negative


(NEGATIVE) 


  


 


 


Urine Blood


  


  Negative


(NEGATIVE) 


  


 


 


Urine Nitrite


  


  Negative


(NEGATIVE) 


  


 


 


Urine Bilirubin


  


  Negative


(NEGATIVE) 


  


 


 


Urine Urobilinogen


  


  Normal MG/DL


(0.0-1.0) 


  


 


 


Urine Leukocyte Esterase


  


  Negative


(NEGATIVE) 


  


 


 


Arterial Blood pH


  


  


  7.367


(7.350-7.450) 


 


 


Arterial Blood Partial


Pressure CO2 


  


  41.0 mmHg


(35.0-45.0) 


 


 


Arterial Blood Partial


Pressure O2 


  


  163.0 mmHg


(75.0-100.0)  H 


 


 


Arterial Blood HCO3


  


  


  23.0 mmol/L


(22.0-26.0) 


 


 


Arterial Blood Oxygen


Saturation 


  


  98.3 %


() 


 


 


Arterial Blood Base Excess   -2.2 (-2-2)  L 


 


Jose D Test   Positive   











Microbiology








 Date/Time


Source Procedure


Growth Status


 


 


 2/6/20 12:00


Nasal Nares - Final Complete


 


 2/6/20 12:00


Nasal Nares - Final Complete








Height (Feet):  5


Height (Inches):  4.00


Weight (Pounds):  185


Medications





Current Medications








 Medications


  (Trade)  Dose


 Ordered  Sig/Patsy


 Route


 PRN Reason  Start Time


 Stop Time Status Last Admin


Dose Admin


 


 Acetaminophen


  (Tylenol)  650 mg  Q4H  PRN


 ORAL


 Mild Pain (Pain Scale 1-3)  2/6/20 13:45


 3/7/20 13:44   


 


 


 Albuterol/


 Ipratropium


  (Albuterol/


 Ipratropium)  3 ml  EVERY 6  HOURS


 HHN


   2/6/20 13:45


 2/11/20 13:44 UNV  


 


 


 Amlodipine


 Besylate


  (Norvasc)  5 mg  DAILY


 ORAL


   2/6/20 13:45


 3/7/20 13:44 UNV  


 


 


 Cefepime HCl 2 gm/


 Sodium Chloride  110 ml @ 


 220 mls/hr  Q8H


 IV


   2/6/20 11:15


 2/7/20 11:14  2/6/20 12:25


 


 


 Dextrose


  (Dextrose 50%)  25 ml  Q30M  PRN


 IV


 Hypoglycemia  2/6/20 13:45


 3/7/20 13:44   


 


 


 Dextrose


  (Dextrose 50%)  50 ml  Q30M  PRN


 IV


 Hypoglycemia  2/6/20 13:45


 3/7/20 13:44   


 


 


 Furosemide


  (Lasix)  40 mg  DAILY


 IV


   2/6/20 13:45


 3/7/20 13:44 UNV  


 


 


 Levothyroxine


 Sodium


  (Synthroid)  100 mcg  DAILY@0630


 ORAL


   2/7/20 06:30


 3/8/20 06:29 UNV  


 


 


 Magnesium


 Hydroxide


  (Mom)  30 ml  HSPRN  PRN


 ORAL


 Constipation  2/6/20 13:45


 3/7/20 13:44 UNV  


 











Assessment/Plan


Problem List:  


(1) Lactic acid acidosis


ICD Codes:  E87.2 - Acidosis


SNOMED:  49159771


(2) Leukocytosis


ICD Codes:  D72.829 - Elevated white blood cell count, unspecified


SNOMED:  041388921, 879302334


(3) Healthcare-associated pneumonia


ICD Codes:  J18.9 - Pneumonia, unspecified organism


SNOMED:  935444216, 539763775


(4) Diastolic CHF


ICD Codes:  I50.30 - Unspecified diastolic (congestive) heart failure


SNOMED:  18109474, 002981883


(5) COPD exacerbation


ICD Codes:  J44.1 - Chronic obstructive pulmonary disease with (acute) 

exacerbation


SNOMED:  760566774


(6) Alzheimer's dementia


ICD Codes:  G30.9 - Alzheimer's disease, unspecified


SNOMED:  53590967


(7) HTN (hypertension)


ICD Codes:  I10 - Essential (primary) hypertension


SNOMED:  09989945


(8) Hypothyroidism


ICD Codes:  E03.9 - Hypothyroidism, unspecified


SNOMED:  01254236


(9) Failure to thrive


SNOMED:  04909810


Status Narrative


85 F h/o alzheimers dementia, nursing home resident, COPD, mild diastolic CHF, 

HTN, hypothyroidism and recent admission @ [facility] with a COPD exacerbation now p/

w respiratory failure likely 2/2 an acute COPD exacerbation +/- an antecedant 

URI/tracheobronchitis vs early PNA (HAP).  With respect to her volume status 

she has mild DD only and appears slightly fluid overloaded on exam.   The exact 

etiology of her lactic acidosis in unknown but she does not seem to be having a 

rip roaring infection at this time and review of CS-link shows an elevated LA 

in the past.


Assessment/Plan:


Optimize pulmonary hygiene/mobilize as tolerated


Change BiPAP to PRN


Titrate O2 to keep SaO2 > 90%


SM 40 IV BID and taper


RTC and PRN HHN's


Start Levaquin/Flagyl/Vanco for HAP in PCN allergy


Check rapid flu


Monitor volumes and renal function, cautious diuresis as tolerated


DVT Px: Hep SQ


Aspiration precautions, SLP Morgan Macedo MD Feb 6, 2020 14:39

## 2020-02-06 NOTE — DIAGNOSTIC IMAGING REPORT
Indication: Dyspnea

 

Comparison:  12/29/2016

 

A single view chest radiograph was obtained.

 

Findings:

 

Interstitial edema suspected with interstitial densities, prominent vascularity and

heart size. No definite pleural effusions are identified. Aorta is ectatic. There is

a deformity of the right humeral head. Bones are osteopenic.

 

IMPRESSION:

 

Suspected CHF.

## 2020-02-06 NOTE — NUR
NURSE NOTES:

Received pt and report from MICHAEL Billings. Observed pt resting in bed with both eyes open. Pt 
is A/Ox0-1. Cardiac monitor is in placed; pt is NSR. IV site intact, symptomatic and patent. 
Pt is on 2L NC. Bed is in the lowest position and locked. Call light and bedside table is 
within reach. No signs/symptoms of acute distress noted at this time. Will continue plan of 
care.

## 2020-02-07 VITALS — SYSTOLIC BLOOD PRESSURE: 135 MMHG | DIASTOLIC BLOOD PRESSURE: 56 MMHG

## 2020-02-07 VITALS — SYSTOLIC BLOOD PRESSURE: 130 MMHG | DIASTOLIC BLOOD PRESSURE: 65 MMHG

## 2020-02-07 VITALS — DIASTOLIC BLOOD PRESSURE: 78 MMHG | SYSTOLIC BLOOD PRESSURE: 131 MMHG

## 2020-02-07 VITALS — SYSTOLIC BLOOD PRESSURE: 118 MMHG | DIASTOLIC BLOOD PRESSURE: 48 MMHG

## 2020-02-07 VITALS — DIASTOLIC BLOOD PRESSURE: 52 MMHG | SYSTOLIC BLOOD PRESSURE: 125 MMHG

## 2020-02-07 VITALS — DIASTOLIC BLOOD PRESSURE: 48 MMHG | SYSTOLIC BLOOD PRESSURE: 109 MMHG

## 2020-02-07 LAB
ADD MANUAL DIFF: YES
ANION GAP SERPL CALC-SCNC: 10 MMOL/L (ref 5–15)
BUN SERPL-MCNC: 21 MG/DL (ref 7–18)
CALCIUM SERPL-MCNC: 8.7 MG/DL (ref 8.5–10.1)
CHLORIDE SERPL-SCNC: 110 MMOL/L (ref 98–107)
CHOLEST SERPL-MCNC: 147 MG/DL (ref ?–200)
CO2 SERPL-SCNC: 26 MMOL/L (ref 21–32)
CREAT SERPL-MCNC: 0.9 MG/DL (ref 0.55–1.3)
ERYTHROCYTE [DISTWIDTH] IN BLOOD BY AUTOMATED COUNT: 12.7 % (ref 11.6–14.8)
HCT VFR BLD CALC: 34.8 % (ref 37–47)
HDLC SERPL-MCNC: 59 MG/DL (ref 40–60)
HGB BLD-MCNC: 12.1 G/DL (ref 12–16)
MCV RBC AUTO: 87 FL (ref 80–99)
PLATELET # BLD: 190 K/UL (ref 150–450)
POTASSIUM SERPL-SCNC: 4 MMOL/L (ref 3.5–5.1)
RBC # BLD AUTO: 3.99 M/UL (ref 4.2–5.4)
SODIUM SERPL-SCNC: 146 MMOL/L (ref 136–145)
TRIGL SERPL-MCNC: 63 MG/DL (ref 30–150)
WBC # BLD AUTO: 17.1 K/UL (ref 4.8–10.8)

## 2020-02-07 RX ADMIN — HEPARIN SODIUM SCH UNITS: 5000 INJECTION INTRAVENOUS; SUBCUTANEOUS at 08:39

## 2020-02-07 RX ADMIN — VANCOMYCIN HYDROCHLORIDE SCH MLS/HR: 500 INJECTION, POWDER, LYOPHILIZED, FOR SOLUTION INTRAVENOUS at 08:40

## 2020-02-07 RX ADMIN — METHYLPREDNISOLONE SODIUM SUCCINATE SCH MG: 40 INJECTION, POWDER, LYOPHILIZED, FOR SOLUTION INTRAMUSCULAR; INTRAVENOUS at 08:39

## 2020-02-07 RX ADMIN — VANCOMYCIN HYDROCHLORIDE SCH MLS/HR: 500 INJECTION, POWDER, LYOPHILIZED, FOR SOLUTION INTRAVENOUS at 22:09

## 2020-02-07 RX ADMIN — IPRATROPIUM BROMIDE AND ALBUTEROL SULFATE SCH ML: .5; 3 SOLUTION RESPIRATORY (INHALATION) at 21:20

## 2020-02-07 RX ADMIN — METHYLPREDNISOLONE SODIUM SUCCINATE SCH MG: 40 INJECTION, POWDER, LYOPHILIZED, FOR SOLUTION INTRAMUSCULAR; INTRAVENOUS at 22:09

## 2020-02-07 RX ADMIN — METRONIDAZOLE SCH MG: 500 TABLET ORAL at 17:16

## 2020-02-07 RX ADMIN — METRONIDAZOLE SCH MG: 500 TABLET ORAL at 06:14

## 2020-02-07 RX ADMIN — METRONIDAZOLE SCH MG: 500 TABLET ORAL at 11:28

## 2020-02-07 RX ADMIN — IPRATROPIUM BROMIDE AND ALBUTEROL SULFATE SCH ML: .5; 3 SOLUTION RESPIRATORY (INHALATION) at 12:51

## 2020-02-07 RX ADMIN — HEPARIN SODIUM SCH UNITS: 5000 INJECTION INTRAVENOUS; SUBCUTANEOUS at 22:10

## 2020-02-07 RX ADMIN — METRONIDAZOLE SCH MG: 500 TABLET ORAL at 01:10

## 2020-02-07 RX ADMIN — IPRATROPIUM BROMIDE AND ALBUTEROL SULFATE SCH ML: .5; 3 SOLUTION RESPIRATORY (INHALATION) at 09:19

## 2020-02-07 NOTE — CONSULTATION
History of Present Illness


General


Date patient seen:  Feb 7, 2020


Chief Complaint:  Dyspnea/Respdistress


Referring physician:  Guido Kaplan MD


Reason for Consultation:  SOB/RF





Present Illness


HPI


This a very pleasant 85-year-old female with history of dementia who presented 

to [facility] with shortness of breath and respiratory 

insufficiency admitted for further care and management.  Patient is seemingly 

nonverbal at baseline her eyes are open she is somewhat responsive and tracking 

but unable to provide any reasonable history.  On admission identified to have 

a deep tissue injury in the sacral region left buttock surgery called to 

evaluate and assist with care.  Patient seen, patient [place], chart reviewed.  

Patient identified to have significant leukocytosis 17,000 lactic acidosis and 

abnormal labs.  Chest x-ray reviewed.  Examination performed with nurse at 

bedside.


Allergies:  


Coded Allergies:  


     PENICILLINS (Verified  Allergy, Unknown, 2/6/20)





Medication History


Scheduled


Aspirin* (Aspirin*), 81 MG ORAL DAILY, (Reported)


Donepezil Hcl* (Donepezil Hcl*), 10 MG ORAL DAILY, (Reported)


Furosemide* (Lasix*), 40 MG ORAL DAILY, (Reported)


Ipratropium/Albuterol Sulfate (DuoNeb 0.5-3(2.5)mg/3ml), 3 ML HHN EVERY 6 HOURS,

 (Reported)


Irbesartan* (Avapro*), 75 MG ORAL QPM, (Reported)


Latanoprost* (Xalatan*), 1 DROP BOTH EYES BEDTIME, (Reported)


Levothyroxine Sodium* (Levothyroxine Sodium*), 75 MCG ORAL DAILY@0630


Magnesium Hydroxide* (Milk Of Magnesia*), 30 ML ORAL DAILY, (Reported)


Memantine Hcl* (Namenda*), 10 MG ORAL BID, (Reported)


Prednisone* (Prednisone*), 10 MG ORAL DAILY, (Reported)


Simvastatin (Zocor), 20 MG ORAL BEDTIME, (Reported)





Scheduled PRN


Acetaminophen* (Acetaminophen 325MG Tablet*), 650 MG ORAL Q4H PRN for Mild Pain/

Temp > 100.5, (Reported)


Acetaminophen* (Acetaminophen 325MG Tablet*), 650 MG ORAL Q6H PRN for Mild Pain 

(Pain Scale 1-3), (Reported)





Discontinued Medications


Amlodipine Besylate* (Amlodipine Besylate*), 5 MG ORAL DAILY, (Reported)


   Discontinued Reason: Therapy completed


Clonazepam* (Klonopin*), 0.5 MG ORAL BID, (Reported)


   Discontinued Reason: Therapy completed


Levothyroxine Sodium* (Levothyroxine Sodium*), 100 MCG ORAL DAILY@0630


   Discontinued Reason: Therapy completed


Levothyroxine Sodium* (Levothyroxine Sodium*), 75 MCG ORAL DAILY, (Reported)


   Discontinued Reason: Therapy completed


Memantine HCl/Donepezil HCl (Namzaric 28 mg-10 mg Capsule), 1 EACH PO BEDTIME, (

Reported)


   Discontinued Reason: Therapy completed


Simvastatin (Zocor), 20 MG ORAL BEDTIME, (Reported)


   Discontinued Reason: Therapy completed


Zolpidem Tartrate* (Ambien*), 10 MG ORAL HS PRN for Insomnia, (Reported)


   Discontinued Reason: Therapy completed





Patient History


Limited by:  age, medical condition


History Provided By:  Medical Record, PMD


Healthcare decision maker





Resuscitation status


Do Not Resuscitate


Advanced Directive on File








Review of Systems


ROS Narrative


Unable to obtain given patient's prior medical condition





Physical Exam


General Appearance:  no apparent distress


Lines, tubes and drains:  peripheral


HEENT:  atraumatic, anicteric, mucous membranes moist


Neck:  non-tender, normal alignment, normal inspection


Respiratory/Chest:  no respiratory distress, no accessory muscle use, decreased 

breath sounds


Cardiovascular/Chest:  normal peripheral pulses, normal rate, regular rhythm


Abdomen:  soft, no organomegaly, no mass


Extremities:  non-tender, normal inspection, no calf tenderness


Skin Exam:  warm/dry


Neurologic:  alert





Last 24 Hour Vital Signs








  Date Time  Temp Pulse Resp B/P (MAP) Pulse Ox O2 Delivery O2 Flow Rate FiO2


 


2/7/20 09:00      Nasal Cannula 2.0 


 


2/7/20 08:43  73      


 


2/7/20 08:39  64  135/56    


 


2/7/20 08:00       2.0 


 


2/7/20 07:57 96.8 64 20 135/56 (82) 94   


 


2/7/20 04:00 98.6 73 20 118/48 (71) 97   


 


2/7/20 04:00  73      


 


2/7/20 04:00       2.0 


 


2/7/20 00:00  78      


 


2/7/20 00:00 97.9 78 19 125/52 (76) 97   


 


2/6/20 21:00      Nasal Cannula 2.0 


 


2/6/20 20:00       2.0 


 


2/6/20 20:00  72      


 


2/6/20 20:00 97.7 72 20 110/63 (79) 98   


 


2/6/20 17:55  98      


 


2/6/20 17:41  94  124/54    


 


2/6/20 17:31 98.1 94 22 124/54 (77) 98   


 


2/6/20 17:30      Nasal Cannula 2.0 


 


2/6/20 16:50 97.8 89 21 126/79 96 Room Air  


 


2/6/20 15:30 98.0 86 21 110/58 96  2.0 30


 


2/6/20 13:30  98 25  97 Facial  30





  107 25  98 Bi-Pap  30


 


2/6/20 13:30 98.2 89 22 126/75 96  2.0 30


 


2/6/20 11:39  118 23   Nasal Cannula 2.0 28


 


2/6/20 11:38  119 21  99 Nasal Cannula 2.0 28





  118 23  97   


 


2/6/20 11:30 97.9 100 22 116/62 98   

















Intake and Output  


 


 2/6/20 2/7/20





 19:00 07:00


 


Intake Total 1110 ml 


 


Balance 1110 ml 


 


  


 


Intake Oral 0 ml 


 


IV Total 1110 ml 


 


# Voids  2











Laboratory Tests








Test


  2/6/20


11:45 2/6/20


11:59 2/6/20


12:30 2/6/20


13:37


 


White Blood Count


  17.1 K/UL


(4.8-10.8)  H 


  


  


 


 


Red Blood Count


  4.55 M/UL


(4.20-5.40) 


  


  


 


 


Hemoglobin


  13.4 G/DL


(12.0-16.0) 


  


  


 


 


Hematocrit


  39.9 %


(37.0-47.0) 


  


  


 


 


Mean Corpuscular Volume 88 FL (80-99)     


 


Mean Corpuscular Hemoglobin


  29.5 PG


(27.0-31.0) 


  


  


 


 


Mean Corpuscular Hemoglobin


Concent 33.6 G/DL


(32.0-36.0) 


  


  


 


 


Red Cell Distribution Width


  12.9 %


(11.6-14.8) 


  


  


 


 


Platelet Count


  180 K/UL


(150-450) 


  


  


 


 


Mean Platelet Volume


  6.8 FL


(6.5-10.1) 


  


  


 


 


Neutrophils (%) (Auto)


  57.9 %


(45.0-75.0) 


  


  


 


 


Lymphocytes (%) (Auto)


  31.0 %


(20.0-45.0) 


  


  


 


 


Monocytes (%) (Auto)


  8.4 %


(1.0-10.0) 


  


  


 


 


Eosinophils (%) (Auto)


  1.6 %


(0.0-3.0) 


  


  


 


 


Basophils (%) (Auto)


  1.0 %


(0.0-2.0) 


  


  


 


 


Sodium Level


  145 MMOL/L


(136-145) 


  


  


 


 


Potassium Level


  3.8 MMOL/L


(3.5-5.1) 


  


  


 


 


Chloride Level


  104 MMOL/L


() 


  


  


 


 


Carbon Dioxide Level


  32 MMOL/L


(21-32) 


  


  


 


 


Anion Gap


  9 mmol/L


(5-15) 


  


  


 


 


Blood Urea Nitrogen


  20 mg/dL


(7-18)  H 


  


  


 


 


Creatinine


  0.9 MG/DL


(0.55-1.30) 


  


  


 


 


Estimat Glomerular Filtration


Rate  mL/min (>60)  


  


  


  


 


 


Glucose Level


  149 MG/DL


()  H 


  


  


 


 


Lactic Acid Level


  4.40 mmol/L


(0.4-2.0)  H 


  


  


 


 


Calcium Level


  9.1 MG/DL


(8.5-10.1) 


  


  


 


 


Total Bilirubin


  0.4 MG/DL


(0.2-1.0) 


  


  


 


 


Aspartate Amino Transf


(AST/SGOT) 18 U/L (15-37)


  


  


  


 


 


Alanine Aminotransferase


(ALT/SGPT) 38 U/L (12-78)


  


  


  


 


 


Alkaline Phosphatase


  78 U/L


() 


  


  


 


 


Total Creatine Kinase


  29 U/L


() 


  


  


 


 


Creatine Kinase MB


  1.0 NG/ML


(0.0-3.6) 


  


  


 


 


Creatine Kinase MB Relative


Index 3.4  


  


  


  


 


 


Troponin I


  0.053 ng/mL


(0.000-0.056) 


  


  


 


 


Total Protein


  7.1 G/DL


(6.4-8.2) 


  


  


 


 


Albumin


  3.2 G/DL


(3.4-5.0)  L 


  


  


 


 


Globulin 3.9 g/dL     


 


Albumin/Globulin Ratio


  0.8 (1.0-2.7)


L 


  


  


 


 


Pro-B-Type Natriuretic Peptide


  


  975 pg/mL


(0-125)  H 


  


 


 


Urine Color   Pale yellow   


 


Urine Appearance   Clear   


 


Urine pH   6 (4.5-8.0)   


 


Urine Specific Gravity


  


  


  1.005


(1.005-1.035) 


 


 


Urine Protein


  


  


  Negative


(NEGATIVE) 


 


 


Urine Glucose (UA)


  


  


  Negative


(NEGATIVE) 


 


 


Urine Ketones


  


  


  Negative


(NEGATIVE) 


 


 


Urine Blood


  


  


  Negative


(NEGATIVE) 


 


 


Urine Nitrite


  


  


  Negative


(NEGATIVE) 


 


 


Urine Bilirubin


  


  


  Negative


(NEGATIVE) 


 


 


Urine Urobilinogen


  


  


  Normal MG/DL


(0.0-1.0) 


 


 


Urine Leukocyte Esterase


  


  


  Negative


(NEGATIVE) 


 


 


Arterial Blood pH


  


  


  


  7.367


(7.350-7.450)


 


Arterial Blood Partial


Pressure CO2 


  


  


  41.0 mmHg


(35.0-45.0)


 


Arterial Blood Partial


Pressure O2 


  


  


  163.0 mmHg


(75.0-100.0)  H


 


Arterial Blood HCO3


  


  


  


  23.0 mmol/L


(22.0-26.0)


 


Arterial Blood Oxygen


Saturation 


  


  


  98.3 %


()


 


Arterial Blood Base Excess    -2.2 (-2-2)  L


 


Jose D Test    Positive  


 


Test


  2/6/20


14:18 2/7/20


05:40 


  


 


 


Lactic Acid Level


  5.20 mmol/L


(0.66-2.22)  H 


  


  


 


 


White Blood Count


  


  17.1 K/UL


(4.8-10.8)  H 


  


 


 


Red Blood Count


  


  3.99 M/UL


(4.20-5.40)  L 


  


 


 


Hemoglobin


  


  12.1 G/DL


(12.0-16.0) 


  


 


 


Hematocrit


  


  34.8 %


(37.0-47.0)  L 


  


 


 


Mean Corpuscular Volume  87 FL (80-99)    


 


Mean Corpuscular Hemoglobin


  


  30.3 PG


(27.0-31.0) 


  


 


 


Mean Corpuscular Hemoglobin


Concent 


  34.7 G/DL


(32.0-36.0) 


  


 


 


Red Cell Distribution Width


  


  12.7 %


(11.6-14.8) 


  


 


 


Platelet Count


  


  190 K/UL


(150-450) 


  


 


 


Mean Platelet Volume


  


  6.2 FL


(6.5-10.1)  L 


  


 


 


Neutrophils (%) (Auto)


  


  % (45.0-75.0)


  


  


 


 


Lymphocytes (%) (Auto)


  


  % (20.0-45.0)


  


  


 


 


Monocytes (%) (Auto)   % (1.0-10.0)    


 


Eosinophils (%) (Auto)   % (0.0-3.0)    


 


Basophils (%) (Auto)   % (0.0-2.0)    


 


Differential Total Cells


Counted 


  100  


  


  


 


 


Neutrophils % (Manual)  88 % (45-75)  H  


 


Lymphocytes % (Manual)  8 % (20-45)  L  


 


Monocytes % (Manual)  4 % (1-10)    


 


Eosinophils % (Manual)  0 % (0-3)    


 


Basophils % (Manual)  0 % (0-2)    


 


Band Neutrophils  0 % (0-8)    


 


Platelet Estimate  Adequate    


 


Platelet Morphology  Normal    


 


Red Blood Cell Morphology  Normal    


 


Sodium Level


  


  146 MMOL/L


(136-145)  H 


  


 


 


Potassium Level


  


  4.0 MMOL/L


(3.5-5.1) 


  


 


 


Chloride Level


  


  110 MMOL/L


()  H 


  


 


 


Carbon Dioxide Level


  


  26 MMOL/L


(21-32) 


  


 


 


Anion Gap


  


  10 mmol/L


(5-15) 


  


 


 


Blood Urea Nitrogen


  


  21 mg/dL


(7-18)  H 


  


 


 


Creatinine


  


  0.9 MG/DL


(0.55-1.30) 


  


 


 


Estimat Glomerular Filtration


Rate 


   mL/min (>60)  


  


  


 


 


Glucose Level


  


  136 MG/DL


()  H 


  


 


 


Hemoglobin A1c


  


  5.8 %


(4.3-6.0) 


  


 


 


Calcium Level


  


  8.7 MG/DL


(8.5-10.1) 


  


 


 


Triglycerides Level


  


  63 MG/DL


() 


  


 


 


Cholesterol Level


  


  147 MG/DL (<


200) 


  


 


 


LDL Cholesterol


  


  74 mg/dL


(<100) 


  


 


 


HDL Cholesterol


  


  59 MG/DL


(40-60) 


  


 


 


Cholesterol/HDL Ratio


  


  2.5 (3.3-4.4)


L 


  


 


 


Thyroid Stimulating Hormone


(TSH) 


  0.367 uiU/mL


(0.358-3.740) 


  


 











Microbiology








 Date/Time


Source Procedure


Growth Status


 


 


 2/6/20 12:00


Nasal Nares - Final Complete


 


 2/6/20 12:00


Nasal Nares - Final Complete


 


 2/6/20 15:40


Rectum  Received








Height (Feet):  5


Height (Inches):  4.00


Weight (Pounds):  185


Medications





Current Medications








 Medications


  (Trade)  Dose


 Ordered  Sig/Patsy


 Route


 PRN Reason  Start Time


 Stop Time Status Last Admin


Dose Admin


 


 Acetaminophen


  (Tylenol)  650 mg  Q4H  PRN


 ORAL


 Mild Pain (Pain Scale 1-3)  2/6/20 13:45


 3/7/20 13:44   


 


 


 Albuterol/


 Ipratropium


  (Albuterol/


 Ipratropium)  3 ml  Q6HRT


 HHN


   2/6/20 19:00


 2/11/20 18:59   


 


 


 Amlodipine


 Besylate


  (Norvasc)  5 mg  DAILY


 ORAL


   2/6/20 16:30


 3/7/20 16:29  2/7/20 08:39


 


 


 Dextrose


  (Dextrose 50%)  25 ml  Q30M  PRN


 IV


 Hypoglycemia  2/6/20 13:45


 3/7/20 13:44   


 


 


 Dextrose


  (Dextrose 50%)  50 ml  Q30M  PRN


 IV


 Hypoglycemia  2/6/20 13:45


 3/7/20 13:44   


 


 


 Furosemide


  (Lasix)  40 mg  DAILY


 IV


   2/6/20 16:23


 3/7/20 16:22  2/7/20 08:40


 


 


 Heparin Sodium


  (Porcine)


  (Heparin 5000


 units/ml)  5,000 units  Q12HR


 SUBQ


   2/6/20 21:00


 3/7/20 20:59  2/7/20 08:39


 


 


 Levofloxacin  150 ml @ 


 100 mls/hr  Q48H


 IVPB


   2/6/20 20:00


 2/13/20 19:59  2/6/20 20:20


 


 


 Levothyroxine


 Sodium


  (Synthroid)  100 mcg  DAILY@0630


 ORAL


   2/7/20 06:30


 3/8/20 06:29  2/7/20 06:14


 


 


 Magnesium


 Hydroxide


  (Mom)  30 ml  HSPRN  PRN


 ORAL


 Constipation  2/6/20 16:24


 3/7/20 16:23   


 


 


 Methylprednisolone


 Sodium Succinate


  (Solu-MEDROL)  40 mg  EVERY 12  HOURS


 IVP


   2/6/20 21:00


 3/7/20 20:59  2/7/20 08:39


 


 


 Metronidazole


  (Flagyl)  500 mg  Q6HR


 ORAL


   2/6/20 18:00


 2/13/20 17:59  2/7/20 06:14


 


 


 Vancomycin HCl


  (Vanco rx to


 dose)  1 ea  DAILY  PRN


 MISC


 Per rx protocol  2/6/20 14:45


 3/7/20 14:44   


 


 


 Vancomycin HCl


 500 mg/Sodium


 Chloride  110 ml @ 


 110 mls/hr  Q12HR


 IVPB


   2/6/20 21:00


 2/11/20 20:59  2/7/20 08:40


 











Assessment/Plan


Problem List:  


(1) Deep tissue injury


Assessment & Plan:  85-year-old female presented with a deep tissue injury in 

the sacrum and left buttock area.  No tissue breakdown.  Erythema somewhat 

blanchable extending from the sacral region to the left buttock.  No drainage 

no fluctuance patient expresses some discomfort upon palpation.  Need to ensure 

improvement and appropriate care as high risk for breaking down/worsening at 

which time care for would be very difficult.


Pt presented on admission with DTPI L upper buttocks(L)2.5cm x (W)2.2cm. Base 

of wound is maroon over Historical scarred area. Non-blanching erythema 

periwound. 


R and L heels are both boggy with non-blanchable erythema. 


No other skin concerns noted.





Tx.plan: 





Apply Moisture Barrier Paste to buttocks. Cover with Optifoam Drsg. Change 

every 3 days and prn.


            


Apply Cavilon Skin Barrier to both heels. Cover each heel with Optifoam drsg. 

Change every 7 days and prn.


            


Reposition at least every 2hours or as tolerated.





Air mattress





Offload heels with pillow





Nutritional optimization





We will follow with recommendations thank you for let me participate in patient'

s care


ICD Codes:  T14.8XXA - Other injury of unspecified body region, initial 

encounter


SNOMED:  222127152


(2) Leukocytosis


Assessment & Plan:  Leukocytosis and lactic acidosis work-up currently underway 

urine clear receiving IV hydration antibiotics per infectious disease


ICD Codes:  D72.829 - Elevated white blood cell count, unspecified


SNOMED:  979677691, 836886171


(3) Lactic acid acidosis


ICD Codes:  E87.2 - Acidosis


SNOMED:  98174817


(4) Nursing home resident


ICD Codes:  Z59.3 - Problems related to living in residential institution


SNOMED:  404624003











Cliff Combs Feb 7, 2020 11:23

## 2020-02-07 NOTE — NUR
CASE MANAGEMENT:REVIEW



85 YR OLD FEMALE BIBA FROM [facility]



CC: SOB



SI: COPD EXACERBATION. LACTIC ACIDOSIS

97.8   127  22  116/62  97% ON BIPAP

WBC+17.1    BUN+20



IS: TITRATED O2 TO 2L/NC

2L NS BOLUS

IV CEFEPIME 

IV SOLUMEDROL

IV LASIX

**: TO TELEMETRY 

DCP: RETURN TO [facility] UPON DISCHARGE

## 2020-02-07 NOTE — NUR
NURSE NOTES:

Received pt from MAY RN, Pt is awake and alert, pt has SOB with 2LMP. pt has intact iv 
access RFA 22G SL. Pt is on continues heart monitoring, no complain of pain at this moment. 
Pt is NPO due to order. all needs attended, bed is locked and is in the lowest position, 
call light within easy reach. will continue to monitor.

## 2020-02-07 NOTE — GENERAL PROGRESS NOTE
Assessment/Plan


Problem List:  


(1) Acute respiratory failure with hypoxia


ICD Codes:  J96.01 - Acute respiratory failure with hypoxia


SNOMED:  94462253, 138784021


(2) HTN (hypertension)


ICD Codes:  I10 - Essential (primary) hypertension


SNOMED:  41356450


(3) Diastolic CHF


ICD Codes:  I50.30 - Unspecified diastolic (congestive) heart failure


SNOMED:  69343348, 804537109


(4) COPD exacerbation


ICD Codes:  J44.1 - Chronic obstructive pulmonary disease with (acute) 

exacerbation


SNOMED:  182376614


(5) Alzheimer's dementia


ICD Codes:  G30.9 - Alzheimer's disease, unspecified


SNOMED:  69978548


(6) Hypothyroidism


ICD Codes:  E03.9 - Hypothyroidism, unspecified


SNOMED:  16746311


Assessment/Plan:


diuresis


abxs


Bronchodilators


IV steroids


Discussed with dr Billy





Subjective


Allergies:  


Coded Allergies:  


     PENICILLINS (Verified  Allergy, Unknown, 2/6/20)


Subjective


looks better





Objective





Last 24 Hour Vital Signs








  Date Time  Temp Pulse Resp B/P (MAP) Pulse Ox O2 Delivery O2 Flow Rate FiO2


 


2/7/20 09:00      Nasal Cannula 2.0 


 


2/7/20 08:43  73      


 


2/7/20 08:39  64  135/56    


 


2/7/20 08:00       2.0 


 


2/7/20 07:57 96.8 64 20 135/56 (82) 94   


 


2/7/20 04:00 98.6 73 20 118/48 (71) 97   


 


2/7/20 04:00  73      


 


2/7/20 04:00       2.0 


 


2/7/20 00:00  78      


 


2/7/20 00:00 97.9 78 19 125/52 (76) 97   


 


2/6/20 21:00      Nasal Cannula 2.0 


 


2/6/20 20:00       2.0 


 


2/6/20 20:00  72      


 


2/6/20 20:00 97.7 72 20 110/63 (79) 98   


 


2/6/20 17:55  98      


 


2/6/20 17:41  94  124/54    


 


2/6/20 17:31 98.1 94 22 124/54 (77) 98   


 


2/6/20 17:30      Nasal Cannula 2.0 


 


2/6/20 16:50 97.8 89 21 126/79 96 Room Air  


 


2/6/20 15:30 98.0 86 21 110/58 96  2.0 30


 


2/6/20 13:30  98 25  97 Facial  30





  107 25  98 Bi-Pap  30


 


2/6/20 13:30 98.2 89 22 126/75 96  2.0 30


 


2/6/20 11:39  118 23   Nasal Cannula 2.0 28


 


2/6/20 11:38  119 21  99 Nasal Cannula 2.0 28





  118 23  97   


 


2/6/20 11:30 97.9 100 22 116/62 98   


 


2/6/20 11:04 97.9 127 22 116/62 (80) 98   

















Intake and Output  


 


 2/6/20 2/7/20





 19:00 07:00


 


Intake Total 1110 ml 


 


Balance 1110 ml 


 


  


 


Intake Oral 0 ml 


 


IV Total 1110 ml 


 


# Voids  2








Laboratory Tests


2/6/20 11:45: 


White Blood Count 17.1H, Red Blood Count 4.55, Hemoglobin 13.4, Hematocrit 39.9

, Mean Corpuscular Volume 88, Mean Corpuscular Hemoglobin 29.5, Mean 

Corpuscular Hemoglobin Concent 33.6, Red Cell Distribution Width 12.9, Platelet 

Count 180, Mean Platelet Volume 6.8, Neutrophils (%) (Auto) 57.9, Lymphocytes (%

) (Auto) 31.0, Monocytes (%) (Auto) 8.4, Eosinophils (%) (Auto) 1.6, Basophils (

%) (Auto) 1.0, Sodium Level 145, Potassium Level 3.8, Chloride Level 104, 

Carbon Dioxide Level 32, Anion Gap 9, Blood Urea Nitrogen 20H, Creatinine 0.9, 

Estimat Glomerular Filtration Rate , Glucose Level 149H, Lactic Acid Level 4.40H

, Calcium Level 9.1, Total Bilirubin 0.4, Aspartate Amino Transf (AST/SGOT) 18, 

Alanine Aminotransferase (ALT/SGPT) 38, Alkaline Phosphatase 78, Total Creatine 

Kinase 29, Creatine Kinase MB 1.0, Creatine Kinase MB Relative Index 3.4, 

Troponin I 0.053, Total Protein 7.1, Albumin 3.2L, Globulin 3.9, Albumin/

Globulin Ratio 0.8L


2/6/20 11:59: Pro-B-Type Natriuretic Peptide 975H


2/6/20 12:30: 


Urine Color Pale yellow, Urine Appearance Clear, Urine pH 6, Urine Specific 

Gravity 1.005, Urine Protein Negative, Urine Glucose (UA) Negative, Urine 

Ketones Negative, Urine Blood Negative, Urine Nitrite Negative, Urine Bilirubin 

Negative, Urine Urobilinogen Normal, Urine Leukocyte Esterase Negative


2/6/20 13:37: 


Arterial Blood pH 7.367, Arterial Blood Partial Pressure CO2 41.0, Arterial 

Blood Partial Pressure O2 163.0H, Arterial Blood HCO3 23.0, Arterial Blood 

Oxygen Saturation 98.3, Arterial Blood Base Excess -2.2L, Jose D Test Positive


2/6/20 14:18: Lactic Acid Level 5.20H


2/7/20 05:40: 


White Blood Count 17.1H, Red Blood Count 3.99L, Hemoglobin 12.1, Hematocrit 

34.8L, Mean Corpuscular Volume 87, Mean Corpuscular Hemoglobin 30.3, Mean 

Corpuscular Hemoglobin Concent 34.7, Red Cell Distribution Width 12.7, Platelet 

Count 190, Mean Platelet Volume 6.2L, Neutrophils (%) (Auto) , Lymphocytes (%) (

Auto) , Monocytes (%) (Auto) , Eosinophils (%) (Auto) , Basophils (%) (Auto) , 

Differential Total Cells Counted 100, Neutrophils % (Manual) 88H, Lymphocytes % 

(Manual) 8L, Monocytes % (Manual) 4, Eosinophils % (Manual) 0, Basophils % (

Manual) 0, Band Neutrophils 0, Platelet Estimate Adequate, Platelet Morphology 

Normal, Red Blood Cell Morphology Normal, Sodium Level 146H, Potassium Level 4.0

, Chloride Level 110H, Carbon Dioxide Level 26, Anion Gap 10, Blood Urea 

Nitrogen 21H, Creatinine 0.9, Estimat Glomerular Filtration Rate , Glucose 

Level 136H, Hemoglobin A1c 5.8, Calcium Level 8.7, Triglycerides Level 63, 

Cholesterol Level 147, LDL Cholesterol 74, HDL Cholesterol 59, Cholesterol/HDL 

Ratio 2.5L, Thyroid Stimulating Hormone (TSH) 0.367


Height (Feet):  5


Height (Inches):  4.00


Weight (Pounds):  185


Cardiovascular:  normal rate


Respiratory/Chest:  expiratory wheezing - less











Guido Kaplan MD Feb 7, 2020 10:57

## 2020-02-07 NOTE — PULMONOLOGY PROGRESS NOTE
Assessment/Plan


Problems:  


(1) Lactic acid acidosis


(2) Leukocytosis


(3) Healthcare-associated pneumonia


(4) Diastolic CHF


(5) COPD exacerbation


(6) Alzheimer's dementia


(7) HTN (hypertension)


(8) Hypothyroidism


(9) Failure to thrive


Assessment/Plan


85 F h/o alzheimers dementia, nursing home resident, COPD, mild diastolic CHF, 

HTN, hypothyroidism and recent admission @ [facility] with a COPD exacerbation now p/

w respiratory failure likely 2/2 an acute COPD exacerbation +/- an antecedant 

URI/tracheobronchitis vs early PNA (HAP).  With respect to her volume status 

she has mild DD only and appears slightly fluid overloaded on exam.   The exact 

etiology of her lactic acidosis in unknown but she does not seem to be having a 

rip roaring infection at this time and review of CS-link shows an elevated LA 

in the past.


Assessment/Plan:


Optimize pulmonary hygiene/mobilize as tolerated


BiPAP PRN


Titrate O2 to keep SaO2 > 90%


Dec SM to 30 IV BID and taper


RTC and PRN HHN's


Levaquin/Flagyl/Vanco (D2)


F/U rapid flu


Monitor volumes and renal function, cautious diuresis as tolerated


DVT Px: Hep SQ


Aspiration precautions, SLP recs


Wound care


DNAR





Subjective


Allergies:  


Coded Allergies:  


     PENICILLINS (Verified  Allergy, Unknown, 2/6/20)


Subjective


AFVSS O2 needs stable


Less cough and wheezing no distress stevenson PO





Objective





Last 24 Hour Vital Signs








  Date Time  Temp Pulse Resp B/P (MAP) Pulse Ox O2 Delivery O2 Flow Rate FiO2


 


2/7/20 16:00 96.6 81 18 131/78 (95) 93   


 


2/7/20 16:00       2.0 


 


2/7/20 15:38  87      


 


2/7/20 13:01  79 18  96 Room Air  21





  75 18  93   


 


2/7/20 12:50  75 18  93 Room Air  21


 


2/7/20 12:00       2.0 


 


2/7/20 12:00 98.2 73 19 130/65 (86) 96   


 


2/7/20 11:38  81      


 


2/7/20 09:00      Nasal Cannula 2.0 


 


2/7/20 08:43  73      


 


2/7/20 08:39  64  135/56    


 


2/7/20 08:00       2.0 


 


2/7/20 07:57 96.8 64 20 135/56 (82) 94   


 


2/7/20 04:00 98.6 73 20 118/48 (71) 97   


 


2/7/20 04:00  73      


 


2/7/20 04:00       2.0 


 


2/7/20 00:00  78      


 


2/7/20 00:00 97.9 78 19 125/52 (76) 97   


 


2/6/20 21:00      Nasal Cannula 2.0 


 


2/6/20 20:00       2.0 


 


2/6/20 20:00  72      


 


2/6/20 20:00 97.7 72 20 110/63 (79) 98   

















Intake and Output  


 


 2/6/20 2/7/20





 19:00 07:00


 


Intake Total 1110 ml 


 


Balance 1110 ml 


 


  


 


Intake Oral 0 ml 


 


IV Total 1110 ml 


 


# Voids  2








General Appearance:  no acute distress, cachetic


HEENT:  normocephalic, atraumatic, anicteric, mucous membranes moist


Respiratory/Chest:  crackles/rales, rhonchi


Cardiovascular:  normal peripheral pulses, normal rate, regular rhythm


Abdomen:  normal bowel sounds, soft, non tender, no organomegaly, non distended

, no mass


Extremities:  no cyanosis, no clubbing, no edema





Microbiology








 Date/Time


Source Procedure


Growth Status


 


 


 2/6/20 12:00


Nasal Nares - Final Complete


 


 2/6/20 12:00


Nasal Nares - Final Complete


 


 2/6/20 15:40


Rectum  Received








Laboratory Tests


2/7/20 05:40: 


White Blood Count 17.1H, Red Blood Count 3.99L, Hemoglobin 12.1, Hematocrit 

34.8L, Mean Corpuscular Volume 87, Mean Corpuscular Hemoglobin 30.3, Mean 

Corpuscular Hemoglobin Concent 34.7, Red Cell Distribution Width 12.7, Platelet 

Count 190, Mean Platelet Volume 6.2L, Neutrophils (%) (Auto) , Lymphocytes (%) (

Auto) , Monocytes (%) (Auto) , Eosinophils (%) (Auto) , Basophils (%) (Auto) , 

Differential Total Cells Counted 100, Neutrophils % (Manual) 88H, Lymphocytes % 

(Manual) 8L, Monocytes % (Manual) 4, Eosinophils % (Manual) 0, Basophils % (

Manual) 0, Band Neutrophils 0, Platelet Estimate Adequate, Platelet Morphology 

Normal, Red Blood Cell Morphology Normal, Sodium Level 146H, Potassium Level 4.0

, Chloride Level 110H, Carbon Dioxide Level 26, Anion Gap 10, Blood Urea 

Nitrogen 21H, Creatinine 0.9, Estimat Glomerular Filtration Rate , Glucose 

Level 136H, Hemoglobin A1c 5.8, Calcium Level 8.7, Triglycerides Level 63, 

Cholesterol Level 147, LDL Cholesterol 74, HDL Cholesterol 59, Cholesterol/HDL 

Ratio 2.5L, Thyroid Stimulating Hormone (TSH) 0.367





Current Medications








 Medications


  (Trade)  Dose


 Ordered  Sig/Patsy


 Route


 PRN Reason  Start Time


 Stop Time Status Last Admin


Dose Admin


 


 Acetaminophen


  (Tylenol)  650 mg  Q4H  PRN


 ORAL


 Mild Pain (Pain Scale 1-3)  2/6/20 13:45


 3/7/20 13:44  2/7/20 17:18


 


 


 Albuterol/


 Ipratropium


  (Albuterol/


 Ipratropium)  3 ml  Q6HRT


 HHN


   2/6/20 19:00


 2/11/20 18:59  2/7/20 12:51


 


 


 Amlodipine


 Besylate


  (Norvasc)  5 mg  DAILY


 ORAL


   2/6/20 16:30


 3/7/20 16:29  2/7/20 08:39


 


 


 Dextrose


  (Dextrose 50%)  25 ml  Q30M  PRN


 IV


 Hypoglycemia  2/6/20 13:45


 3/7/20 13:44   


 


 


 Dextrose


  (Dextrose 50%)  50 ml  Q30M  PRN


 IV


 Hypoglycemia  2/6/20 13:45


 3/7/20 13:44   


 


 


 Furosemide


  (Lasix)  40 mg  DAILY


 IV


   2/6/20 16:23


 3/7/20 16:22  2/7/20 08:40


 


 


 Heparin Sodium


  (Porcine)


  (Heparin 5000


 units/ml)  5,000 units  Q12HR


 SUBQ


   2/6/20 21:00


 3/7/20 20:59  2/7/20 08:39


 


 


 Levofloxacin  150 ml @ 


 100 mls/hr  Q48H


 IVPB


   2/6/20 20:00


 2/13/20 19:59  2/6/20 20:20


 


 


 Levothyroxine


 Sodium


  (Synthroid)  100 mcg  DAILY@0630


 ORAL


   2/7/20 06:30


 3/8/20 06:29  2/7/20 06:14


 


 


 Magnesium


 Hydroxide


  (Mom)  30 ml  HSPRN  PRN


 ORAL


 Constipation  2/6/20 16:24


 3/7/20 16:23   


 


 


 Methylprednisolone


 Sodium Succinate


  (Solu-MEDROL)  40 mg  EVERY 12  HOURS


 IVP


   2/6/20 21:00


 3/7/20 20:59  2/7/20 08:39


 


 


 Metronidazole


  (Flagyl)  500 mg  Q6HR


 ORAL


   2/6/20 18:00


 2/13/20 17:59  2/7/20 17:16


 


 


 Vancomycin HCl


  (Vanco rx to


 dose)  1 ea  DAILY  PRN


 MISC


 Per rx protocol  2/6/20 14:45


 3/7/20 14:44   


 


 


 Vancomycin HCl


 500 mg/Sodium


 Chloride  110 ml @ 


 110 mls/hr  Q12HR


 IVPB


   2/6/20 21:00


 2/11/20 20:59  2/7/20 08:40


 

















Morgan Billy MD Feb 7, 2020 18:13

## 2020-02-07 NOTE — NUR
NURSE NOTES:WOUND CARE NOTES:Pt presented on admission with DTPI L upper buttocks(L)2.5cm x 
(W)2.2cm. Base of wound is maroon over Historical scarred area. Non-blanching erythema 
periwound. 

R and L heels are both boggy with non-blanchable erythema. 

No other skin concerns noted.



Tx.plan: Apply Moisture Barrier Paste to buttocks. Cover with Optifoam Drsg. Change every 3 
days and prn.

            Apply Cavilon Skin Barrier to both heels. Cover each heel with Optifoam drsg. 
Change every 7 days and prn.

            Reposition at least every 2hours or as tolerated.

            Off-load heels with pillow.

## 2020-02-07 NOTE — NUR
NURSE NOTES:

Dr MONCADA visited pt and is aware about L buttock DTI and BI HEELS redness, ordered consult 
with Dr NG, Dr NG visited pt, no new order to RN. will continue to monitor.

## 2020-02-07 NOTE — NUR
NURSE NOTES:

Received pt from [facility], Pt is awake, eyes open on NC at 2 L  O2, no SOB, IV access - 
infiltrated, placed new IV on left forearm 24 g NS lock for abx. Bed is locked and is in the 
lowest position, side rails x3 for safety, call light within easy reach. will continue to 
monitor. VIEW ALL

## 2020-02-08 VITALS — SYSTOLIC BLOOD PRESSURE: 137 MMHG | DIASTOLIC BLOOD PRESSURE: 63 MMHG

## 2020-02-08 VITALS — SYSTOLIC BLOOD PRESSURE: 130 MMHG | DIASTOLIC BLOOD PRESSURE: 52 MMHG

## 2020-02-08 VITALS — DIASTOLIC BLOOD PRESSURE: 52 MMHG | SYSTOLIC BLOOD PRESSURE: 115 MMHG

## 2020-02-08 VITALS — DIASTOLIC BLOOD PRESSURE: 46 MMHG | SYSTOLIC BLOOD PRESSURE: 126 MMHG

## 2020-02-08 VITALS — SYSTOLIC BLOOD PRESSURE: 116 MMHG | DIASTOLIC BLOOD PRESSURE: 79 MMHG

## 2020-02-08 VITALS — SYSTOLIC BLOOD PRESSURE: 140 MMHG | DIASTOLIC BLOOD PRESSURE: 89 MMHG

## 2020-02-08 LAB
ADD MANUAL DIFF: YES
ANION GAP SERPL CALC-SCNC: 8 MMOL/L (ref 5–15)
BUN SERPL-MCNC: 35 MG/DL (ref 7–18)
CALCIUM SERPL-MCNC: 9.5 MG/DL (ref 8.5–10.1)
CHLORIDE SERPL-SCNC: 109 MMOL/L (ref 98–107)
CO2 SERPL-SCNC: 31 MMOL/L (ref 21–32)
CREAT SERPL-MCNC: 1 MG/DL (ref 0.55–1.3)
ERYTHROCYTE [DISTWIDTH] IN BLOOD BY AUTOMATED COUNT: 12.6 % (ref 11.6–14.8)
HCT VFR BLD CALC: 39.4 % (ref 37–47)
HGB BLD-MCNC: 13.4 G/DL (ref 12–16)
MCV RBC AUTO: 87 FL (ref 80–99)
PLATELET # BLD: 254 K/UL (ref 150–450)
POTASSIUM SERPL-SCNC: 3.2 MMOL/L (ref 3.5–5.1)
RBC # BLD AUTO: 4.52 M/UL (ref 4.2–5.4)
SODIUM SERPL-SCNC: 148 MMOL/L (ref 136–145)
WBC # BLD AUTO: 26.5 K/UL (ref 4.8–10.8)

## 2020-02-08 RX ADMIN — METRONIDAZOLE SCH MG: 500 TABLET ORAL at 11:53

## 2020-02-08 RX ADMIN — METHYLPREDNISOLONE SODIUM SUCCINATE SCH MG: 40 INJECTION, POWDER, LYOPHILIZED, FOR SOLUTION INTRAMUSCULAR; INTRAVENOUS at 20:57

## 2020-02-08 RX ADMIN — IPRATROPIUM BROMIDE AND ALBUTEROL SULFATE SCH ML: .5; 3 SOLUTION RESPIRATORY (INHALATION) at 19:51

## 2020-02-08 RX ADMIN — HEPARIN SODIUM SCH UNITS: 5000 INJECTION INTRAVENOUS; SUBCUTANEOUS at 09:23

## 2020-02-08 RX ADMIN — METRONIDAZOLE SCH MG: 500 TABLET ORAL at 06:00

## 2020-02-08 RX ADMIN — METRONIDAZOLE SCH MG: 500 TABLET ORAL at 17:59

## 2020-02-08 RX ADMIN — METHYLPREDNISOLONE SODIUM SUCCINATE SCH MG: 40 INJECTION, POWDER, LYOPHILIZED, FOR SOLUTION INTRAMUSCULAR; INTRAVENOUS at 09:21

## 2020-02-08 RX ADMIN — IPRATROPIUM BROMIDE AND ALBUTEROL SULFATE SCH ML: .5; 3 SOLUTION RESPIRATORY (INHALATION) at 13:21

## 2020-02-08 RX ADMIN — IPRATROPIUM BROMIDE AND ALBUTEROL SULFATE SCH ML: .5; 3 SOLUTION RESPIRATORY (INHALATION) at 07:44

## 2020-02-08 RX ADMIN — METRONIDAZOLE SCH MG: 500 TABLET ORAL at 00:00

## 2020-02-08 RX ADMIN — IPRATROPIUM BROMIDE AND ALBUTEROL SULFATE SCH ML: .5; 3 SOLUTION RESPIRATORY (INHALATION) at 01:31

## 2020-02-08 RX ADMIN — VANCOMYCIN HYDROCHLORIDE SCH MLS/HR: 500 INJECTION, POWDER, LYOPHILIZED, FOR SOLUTION INTRAVENOUS at 22:35

## 2020-02-08 RX ADMIN — HEPARIN SODIUM SCH UNITS: 5000 INJECTION INTRAVENOUS; SUBCUTANEOUS at 20:57

## 2020-02-08 RX ADMIN — VANCOMYCIN HYDROCHLORIDE SCH MLS/HR: 500 INJECTION, POWDER, LYOPHILIZED, FOR SOLUTION INTRAVENOUS at 09:20

## 2020-02-08 NOTE — PULMONOLOGY PROGRESS NOTE
Assessment/Plan


Assessment/Plan


(1) Lactic acid acidosis


(2) Leukocytosis


(3) Healthcare-associated pneumonia


(4) Diastolic CHF


(5) COPD exacerbation


(6) Alzheimer's dementia


(7) HTN (hypertension)


(8) Hypothyroidism


(9) Failure to thrive


Assessment/Plan


85 F h/o alzheimers dementia, nursing home resident, COPD, mild diastolic CHF, 

HTN, hypothyroidism and recent admission @ [facility] with a COPD exacerbation now p/

w respiratory failure likely 2/2 an acute COPD exacerbation +/- an antecedant 

URI/tracheobronchitis vs early PNA (HAP).  With respect to her volume status 

she has mild DD only and appears slightly fluid overloaded on exam.   The exact 

etiology of her lactic acidosis in unknown but she does not seem to be having a 

rip roaring infection at this time and review of CS-link shows an elevated LA 

in the past.


Assessment/Plan:


Optimize pulmonary hygiene/mobilize as tolerated


BiPAP PRN


Titrate O2 to keep SaO2 > 90%


Dec SM to 30 IV BID no change today


RTC and PRN HHN's


CXr monday


titrate o2


Levaquin/Flagyl/Vanco


Monitor volumes and renal function, cautious diuresis as tolerated


DVT Px: Hep SQ


Aspiration precautions, SLP recs


Wound care


DNAR





Subjective


ROS Limited/Unobtainable:  Yes


Allergies:  


Coded Allergies:  


     PENICILLINS (Verified  Allergy, Unknown, 2/6/20)


Subjective


cough and shortness of breathnoted


no cp nv or bleeding


on o2





Objective





Last 24 Hour Vital Signs








  Date Time  Temp Pulse Resp B/P (MAP) Pulse Ox O2 Delivery O2 Flow Rate FiO2


 


2/8/20 19:53  47 20  99 Nasal Cannula 2.0 28





  45 20  94   


 


2/8/20 19:51     95 Nasal Cannula 2.0 28


 


2/8/20 16:00 97.8 79 18 116/79 (91) 95   


 


2/8/20 16:00  70      


 


2/8/20 16:00       2.0 


 


2/8/20 13:18  68 20  99 Nasal Cannula 2.0 28





  60 20  94   


 


2/8/20 12:00 98.0 71 18 137/63 (87) 95   


 


2/8/20 12:00       2.0 


 


2/8/20 11:54  66      


 


2/8/20 10:31      Nasal Cannula 2.0 


 


2/8/20 09:20  81  140/89    


 


2/8/20 08:00       2.0 


 


2/8/20 08:00 98.2 81 16 140/89 (106) 95   


 


2/8/20 07:52  61      


 


2/8/20 07:40     95 Nasal Cannula 2.0 28


 


2/8/20 07:40  75 20  98 Nasal Cannula 2.0 28





  70 20  95   


 


2/8/20 04:27 97.8 75 22 126/46 (72) 95   


 


2/8/20 04:21       2.0 


 


2/8/20 04:00  61      


 


2/8/20 01:31  80 18  100 Nasal Cannula 2.0 28





  77 18  98   


 


2/8/20 00:00       2.0 


 


2/8/20 00:00  71      


 


2/8/20 00:00 97.9 94 21 115/52 (73) 95   


 


2/7/20 21:29  81 18   Nasal Cannula 2.0 28


 


2/7/20 21:24     97 Nasal Cannula 2.0 28


 


2/7/20 21:20  83 18  99 Nasal Cannula 2.0 28





  81 18  97   


 


2/7/20 21:00      Nasal Cannula 2.0 

















Intake and Output  


 


 2/7/20 2/8/20





 19:00 07:00


 


Intake Total 250 ml 140 ml


 


Output Total 1400 ml 1400 ml


 


Balance -1150 ml -1260 ml


 


  


 


Intake Oral 140 ml 140 ml


 


IV Total 110 ml 


 


Output Urine Total 1400 ml 1400 ml


 


# Voids 3 3








General Appearance:  WD/WN


Respiratory/Chest:  crackles/rales, rhonchi


Cardiovascular:  normal rate, regular rhythm, edema


Abdomen:  soft, non tender, non distended


Neurologic/Psychiatric:  abnormal gait, disoriented


Lymphatic:  no neck adenopathy


Musculoskeletal:  normal muscle bulk





Microbiology








 Date/Time


Source Procedure


Growth Status


 


 


 2/6/20 11:45


Blood Blood Culture - Preliminary


NO GROWTH AFTER 24 HOURS Resulted


 


 2/6/20 11:30


Blood Blood Culture - Preliminary


NO GROWTH AFTER 24 HOURS Resulted


 


 2/6/20 15:40


Nasal Nares Left MRSA Culture - Final


Staphylococcus Aureus - Mrsa Complete





 2/6/20 12:00


Nasal Nares - Final Complete


 


 2/6/20 12:00


Nasal Nares - Final Complete


 


 2/6/20 15:40


Rectum VRE Culture - Final


Enterococcus Faecalis - Vre Complete








Laboratory Tests


2/8/20 08:10: 


White Blood Count 26.5#*H, Red Blood Count 4.52, Hemoglobin 13.4, Hematocrit 

39.4, Mean Corpuscular Volume 87, Mean Corpuscular Hemoglobin 29.6, Mean 

Corpuscular Hemoglobin Concent 34.0, Red Cell Distribution Width 12.6, Platelet 

Count 254, Mean Platelet Volume 7.4, Neutrophils (%) (Auto) , Lymphocytes (%) (

Auto) , Monocytes (%) (Auto) , Eosinophils (%) (Auto) , Basophils (%) (Auto) , 

Differential Total Cells Counted 100, Neutrophils % (Manual) 84H, Lymphocytes % 

(Manual) 12L, Monocytes % (Manual) 4, Eosinophils % (Manual) 0, Basophils % (

Manual) 0, Band Neutrophils 0, Platelet Estimate Adequate, Platelet Morphology 

Normal, Red Blood Cell Morphology Normal, Sodium Level 148H, Potassium Level 

3.2L, Chloride Level 109H, Carbon Dioxide Level 31, Anion Gap 8, Blood Urea 

Nitrogen 35H, Creatinine 1.0, Estimat Glomerular Filtration Rate , Glucose 

Level 175H, Calcium Level 9.5, Vancomycin Level Trough 10.8





Current Medications








 Medications


  (Trade)  Dose


 Ordered  Sig/Patsy


 Route


 PRN Reason  Start Time


 Stop Time Status Last Admin


Dose Admin


 


 Acetaminophen


  (Tylenol)  650 mg  Q4H  PRN


 ORAL


 Mild Pain (Pain Scale 1-3)  2/6/20 13:45


 3/7/20 13:44  2/7/20 17:18


 


 


 Albuterol/


 Ipratropium


  (Albuterol/


 Ipratropium)  3 ml  Q6HRT


 HHN


   2/6/20 19:00


 2/11/20 18:59  2/8/20 19:51


 


 


 Amlodipine


 Besylate


  (Norvasc)  5 mg  DAILY


 ORAL


   2/6/20 16:30


 3/7/20 16:29  2/8/20 09:20


 


 


 Dextrose


  (Dextrose 50%)  25 ml  Q30M  PRN


 IV


 Hypoglycemia  2/6/20 13:45


 3/7/20 13:44   


 


 


 Dextrose


  (Dextrose 50%)  50 ml  Q30M  PRN


 IV


 Hypoglycemia  2/6/20 13:45


 3/7/20 13:44   


 


 


 Furosemide


  (Lasix)  40 mg  DAILY


 IV


   2/6/20 16:23


 3/7/20 16:22  2/8/20 09:20


 


 


 Heparin Sodium


  (Porcine)


  (Heparin 5000


 units/ml)  5,000 units  Q12HR


 SUBQ


   2/6/20 21:00


 3/7/20 20:59  2/8/20 09:23


 


 


 Levofloxacin  150 ml @ 


 100 mls/hr  Q48H


 IVPB


   2/6/20 20:00


 2/13/20 19:59  2/6/20 20:20


 


 


 Levothyroxine


 Sodium


  (Synthroid)  100 mcg  DAILY@0630


 ORAL


   2/7/20 06:30


 3/8/20 06:29  2/8/20 06:40


 


 


 Magnesium


 Hydroxide


  (Mom)  30 ml  HSPRN  PRN


 ORAL


 Constipation  2/6/20 16:24


 3/7/20 16:23   


 


 


 Methylprednisolone


 Sodium Succinate


  (Solu-MEDROL)  30 mg  EVERY 12  HOURS


 IVP


   2/7/20 21:00


 3/7/20 20:59  2/8/20 09:21


 


 


 Metronidazole


  (Flagyl)  500 mg  Q6HR


 ORAL


   2/6/20 18:00


 2/13/20 17:59  2/8/20 17:59


 


 


 Vancomycin HCl


  (Vanco rx to


 dose)  1 ea  DAILY  PRN


 MISC


 Per rx protocol  2/6/20 14:45


 3/7/20 14:44   


 


 


 Vancomycin HCl


 500 mg/Sodium


 Chloride  110 ml @ 


 110 mls/hr  Q12HR


 IVPB


   2/6/20 21:00


 2/11/20 20:59  2/8/20 09:20


 

















Niurka Cordon DO Feb 8, 2020 20:34

## 2020-02-08 NOTE — SURGERY PROGRESS NOTE
Surgery Progress Note


Subjective


Additional Comments


no acute events 


worsening leukocytosis





Objective





Last 24 Hour Vital Signs








  Date Time  Temp Pulse Resp B/P (MAP) Pulse Ox O2 Delivery O2 Flow Rate FiO2


 


2/8/20 09:20  81  140/89    


 


2/8/20 08:00 98.2 81 16 140/89 (106) 95   


 


2/8/20 07:40     95 Nasal Cannula 2.0 28


 


2/8/20 07:40  75 20  98 Nasal Cannula 2.0 28





  70 20  95   


 


2/8/20 04:27 97.8 75 22 126/46 (72) 95   


 


2/8/20 04:21       2.0 


 


2/8/20 04:00  61      


 


2/8/20 01:31  80 18  100 Nasal Cannula 2.0 28





  77 18  98   


 


2/8/20 00:00       2.0 


 


2/8/20 00:00  71      


 


2/8/20 00:00 97.9 94 21 115/52 (73) 95   


 


2/7/20 21:29  81 18   Nasal Cannula 2.0 28


 


2/7/20 21:24     97 Nasal Cannula 2.0 28


 


2/7/20 21:20  83 18  99 Nasal Cannula 2.0 28





  81 18  97   


 


2/7/20 21:00      Nasal Cannula 2.0 


 


2/7/20 20:00  82      


 


2/7/20 20:00       2.0 


 


2/7/20 20:00 98.6 93 21 109/48 (68) 94   


 


2/7/20 16:00 96.6 81 18 131/78 (95) 93   


 


2/7/20 16:00       2.0 


 


2/7/20 15:38  87      


 


2/7/20 13:01  79 18  96 Room Air  21





  75 18  93   


 


2/7/20 12:50  75 18  93 Room Air  21


 


2/7/20 12:00       2.0 


 


2/7/20 12:00 98.2 73 19 130/65 (86) 96   


 


2/7/20 11:38  81      








I&O











Intake and Output  


 


 2/7/20 2/8/20





 19:00 07:00


 


Intake Total 250 ml 140 ml


 


Output Total 1400 ml 1400 ml


 


Balance -1150 ml -1260 ml


 


  


 


Intake Oral 140 ml 140 ml


 


IV Total 110 ml 


 


Output Urine Total 1400 ml 1400 ml


 


# Voids 3 3








Dressing:  dry


Wound:  clean


Cardiovascular:  RSR


Respiratory:  clear, decreased breath sounds


Abdomen:  soft, present bowel sounds


Extremities:  no cyanosis





Laboratory Tests








Test


  2/8/20


08:10


 


White Blood Count


  26.5 K/UL


(4.8-10.8)  #*H


 


Red Blood Count


  4.52 M/UL


(4.20-5.40)


 


Hemoglobin


  13.4 G/DL


(12.0-16.0)


 


Hematocrit


  39.4 %


(37.0-47.0)


 


Mean Corpuscular Volume 87 FL (80-99)  


 


Mean Corpuscular Hemoglobin


  29.6 PG


(27.0-31.0)


 


Mean Corpuscular Hemoglobin


Concent 34.0 G/DL


(32.0-36.0)


 


Red Cell Distribution Width


  12.6 %


(11.6-14.8)


 


Platelet Count


  254 K/UL


(150-450)


 


Mean Platelet Volume


  7.4 FL


(6.5-10.1)


 


Neutrophils (%) (Auto)


  % (45.0-75.0)


 


 


Lymphocytes (%) (Auto)


  % (20.0-45.0)


 


 


Monocytes (%) (Auto)  % (1.0-10.0)  


 


Eosinophils (%) (Auto)  % (0.0-3.0)  


 


Basophils (%) (Auto)  % (0.0-2.0)  


 


Neutrophils % (Manual) Pending  


 


Lymphocytes % (Manual) Pending  


 


Platelet Estimate Pending  


 


Platelet Morphology Pending  


 


Sodium Level


  148 MMOL/L


(136-145)  H


 


Potassium Level


  3.2 MMOL/L


(3.5-5.1)  L


 


Chloride Level


  109 MMOL/L


()  H


 


Carbon Dioxide Level


  31 MMOL/L


(21-32)


 


Anion Gap


  8 mmol/L


(5-15)


 


Blood Urea Nitrogen


  35 mg/dL


(7-18)  H


 


Creatinine


  1.0 MG/DL


(0.55-1.30)


 


Estimat Glomerular Filtration


Rate  mL/min (>60)  


 


 


Glucose Level


  175 MG/DL


()  H


 


Calcium Level


  9.5 MG/DL


(8.5-10.1)


 


Vancomycin Level Trough


  10.8 ug/mL


(5.0-12.0)











Plan


Problems:  


(1) Deep tissue injury


Assessment & Plan:  85-year-old female presented with a deep tissue injury in 

the sacrum and left buttock area.  No tissue breakdown.  Erythema somewhat 

blanchable extending from the sacral region to the left buttock.  No drainage 

no fluctuance patient expresses some discomfort upon palpation.  Need to ensure 

improvement and appropriate care as high risk for breaking down/worsening at 

which time care for would be very difficult.


Pt presented on admission with DTPI L upper buttocks(L)2.5cm x (W)2.2cm. Base 

of wound is maroon over Historical scarred area. Non-blanching erythema 

periwound. 


R and L heels are both boggy with non-blanchable erythema. 


No other skin concerns noted.





Tx.plan: 





Apply Moisture Barrier Paste to buttocks. Cover with Optifoam Drsg. Change 

every 3 days and prn.


            


Apply Cavilon Skin Barrier to both heels. Cover each heel with Optifoam drsg. 

Change every 7 days and prn.


            


Reposition at least every 2hours or as tolerated.





Air mattress





Offload heels with pillow





Nutritional optimization





We will follow with recommendations thank you for let me participate in patient'

s care





(2) Leukocytosis


Assessment & Plan:  Leukocytosis and lactic acidosis work-up currently underway 

urine clear receiving IV hydration antibiotics per infectious disease





(3) Lactic acid acidosis


(4) Nursing home resident











Cliff Combs Feb 8, 2020 09:40

## 2020-02-08 NOTE — GENERAL PROGRESS NOTE
Assessment/Plan


Problem List:  


(1) Acute respiratory failure with hypoxia


ICD Codes:  J96.01 - Acute respiratory failure with hypoxia


SNOMED:  25157552, 034726850


(2) HTN (hypertension)


ICD Codes:  I10 - Essential (primary) hypertension


SNOMED:  10617076


(3) Diastolic CHF


ICD Codes:  I50.30 - Unspecified diastolic (congestive) heart failure


SNOMED:  01009731, 405169114


(4) COPD exacerbation


ICD Codes:  J44.1 - Chronic obstructive pulmonary disease with (acute) 

exacerbation


SNOMED:  383250558


(5) Alzheimer's dementia


ICD Codes:  G30.9 - Alzheimer's disease, unspecified


SNOMED:  63504843


(6) Hypothyroidism


ICD Codes:  E03.9 - Hypothyroidism, unspecified


SNOMED:  95754913


Assessment/Plan:


diuresis


abxs


Bronchodilators


IV steroids


Discussed with RN





Subjective


Allergies:  


Coded Allergies:  


     PENICILLINS (Verified  Allergy, Unknown, 2/6/20)


Subjective


looks better





Objective





Last 24 Hour Vital Signs








  Date Time  Temp Pulse Resp B/P (MAP) Pulse Ox O2 Delivery O2 Flow Rate FiO2


 


2/8/20 13:18  68 20  99 Nasal Cannula 2.0 28





  60 20  94   


 


2/8/20 12:00 98.0 71 18 137/63 (87) 95   


 


2/8/20 12:00       2.0 


 


2/8/20 11:54  66      


 


2/8/20 10:31      Nasal Cannula 2.0 


 


2/8/20 09:20  81  140/89    


 


2/8/20 08:00       2.0 


 


2/8/20 08:00 98.2 81 16 140/89 (106) 95   


 


2/8/20 07:52  61      


 


2/8/20 07:40     95 Nasal Cannula 2.0 28


 


2/8/20 07:40  75 20  98 Nasal Cannula 2.0 28





  70 20  95   


 


2/8/20 04:27 97.8 75 22 126/46 (72) 95   


 


2/8/20 04:21       2.0 


 


2/8/20 04:00  61      


 


2/8/20 01:31  80 18  100 Nasal Cannula 2.0 28





  77 18  98   


 


2/8/20 00:00       2.0 


 


2/8/20 00:00  71      


 


2/8/20 00:00 97.9 94 21 115/52 (73) 95   


 


2/7/20 21:29  81 18   Nasal Cannula 2.0 28


 


2/7/20 21:24     97 Nasal Cannula 2.0 28


 


2/7/20 21:20  83 18  99 Nasal Cannula 2.0 28





  81 18  97   


 


2/7/20 21:00      Nasal Cannula 2.0 


 


2/7/20 20:00  82      


 


2/7/20 20:00       2.0 


 


2/7/20 20:00 98.6 93 21 109/48 (68) 94   


 


2/7/20 16:00 96.6 81 18 131/78 (95) 93   


 


2/7/20 16:00       2.0 


 


2/7/20 15:38  87      

















Intake and Output  


 


 2/7/20 2/8/20





 19:00 07:00


 


Intake Total 250 ml 140 ml


 


Output Total 1400 ml 1400 ml


 


Balance -1150 ml -1260 ml


 


  


 


Intake Oral 140 ml 140 ml


 


IV Total 110 ml 


 


Output Urine Total 1400 ml 1400 ml


 


# Voids 3 3








Laboratory Tests


2/8/20 08:10: 


White Blood Count 26.5#*H, Red Blood Count 4.52, Hemoglobin 13.4, Hematocrit 

39.4, Mean Corpuscular Volume 87, Mean Corpuscular Hemoglobin 29.6, Mean 

Corpuscular Hemoglobin Concent 34.0, Red Cell Distribution Width 12.6, Platelet 

Count 254, Mean Platelet Volume 7.4, Neutrophils (%) (Auto) , Lymphocytes (%) (

Auto) , Monocytes (%) (Auto) , Eosinophils (%) (Auto) , Basophils (%) (Auto) , 

Differential Total Cells Counted 100, Neutrophils % (Manual) 84H, Lymphocytes % 

(Manual) 12L, Monocytes % (Manual) 4, Eosinophils % (Manual) 0, Basophils % (

Manual) 0, Band Neutrophils 0, Platelet Estimate Adequate, Platelet Morphology 

Normal, Red Blood Cell Morphology Normal, Sodium Level 148H, Potassium Level 

3.2L, Chloride Level 109H, Carbon Dioxide Level 31, Anion Gap 8, Blood Urea 

Nitrogen 35H, Creatinine 1.0, Estimat Glomerular Filtration Rate , Glucose 

Level 175H, Calcium Level 9.5, Vancomycin Level Trough 10.8


Height (Feet):  5


Height (Inches):  4.00


Weight (Pounds):  185


Cardiovascular:  normal rate


Respiratory/Chest:  lungs clear


Edema:  no edema noted Generalized











Guido Kaplan MD Feb 8, 2020 15:13

## 2020-02-08 NOTE — NUR
NURSE NOTES:



Received report from DAVID Gonsalez RN. Patient asleep in bed, nonverbal, unable to make needs 
known and follow commands. Receiving O2 via nasal cannula @ 2L/min, respirations even and 
unlabored. Female external catheter in place and attached to low, continuous suction. Left 
forearm 24g saline lock patent and asymptomatic. Bed locked in lowest position with side 
rails up x 3. All needs attended to. Call light within reach. Will continue to monitor.

## 2020-02-08 NOTE — NUR
NURSE NOTES:

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 Will endorse to day shift to notify Guido Kaplan.

## 2020-02-08 NOTE — NUR
NURSE NOTES:



Left message on answering service for Dr. Guido Kaplan regarding K 3.2, WBC 26.2, and 
positive MRSA nares and VRE rectum.

## 2020-02-08 NOTE — NUR
NURSE NOTES:



Received call back from Dr. Guido Kaplan and received telephone order for 40 meq K-Dur PO q 
4 hours x 2 doses. Dr. Toñito Kaplan to see patient regarding WBC 26.2, MRSA nares and VRE 
rectum.

## 2020-02-09 VITALS — DIASTOLIC BLOOD PRESSURE: 50 MMHG | SYSTOLIC BLOOD PRESSURE: 109 MMHG

## 2020-02-09 VITALS — DIASTOLIC BLOOD PRESSURE: 86 MMHG | SYSTOLIC BLOOD PRESSURE: 140 MMHG

## 2020-02-09 VITALS — DIASTOLIC BLOOD PRESSURE: 61 MMHG | SYSTOLIC BLOOD PRESSURE: 125 MMHG

## 2020-02-09 VITALS — SYSTOLIC BLOOD PRESSURE: 119 MMHG | DIASTOLIC BLOOD PRESSURE: 48 MMHG

## 2020-02-09 VITALS — DIASTOLIC BLOOD PRESSURE: 72 MMHG | SYSTOLIC BLOOD PRESSURE: 152 MMHG

## 2020-02-09 RX ADMIN — METHYLPREDNISOLONE SODIUM SUCCINATE SCH MG: 40 INJECTION, POWDER, LYOPHILIZED, FOR SOLUTION INTRAMUSCULAR; INTRAVENOUS at 22:00

## 2020-02-09 RX ADMIN — METRONIDAZOLE SCH MG: 500 TABLET ORAL at 00:30

## 2020-02-09 RX ADMIN — VANCOMYCIN HYDROCHLORIDE SCH MLS/HR: 500 INJECTION, POWDER, LYOPHILIZED, FOR SOLUTION INTRAVENOUS at 21:59

## 2020-02-09 RX ADMIN — IPRATROPIUM BROMIDE AND ALBUTEROL SULFATE SCH ML: .5; 3 SOLUTION RESPIRATORY (INHALATION) at 07:55

## 2020-02-09 RX ADMIN — IPRATROPIUM BROMIDE AND ALBUTEROL SULFATE SCH ML: .5; 3 SOLUTION RESPIRATORY (INHALATION) at 13:19

## 2020-02-09 RX ADMIN — METRONIDAZOLE SCH MG: 500 TABLET ORAL at 06:06

## 2020-02-09 RX ADMIN — METHYLPREDNISOLONE SODIUM SUCCINATE SCH MG: 40 INJECTION, POWDER, LYOPHILIZED, FOR SOLUTION INTRAMUSCULAR; INTRAVENOUS at 08:54

## 2020-02-09 RX ADMIN — METRONIDAZOLE SCH MG: 500 TABLET ORAL at 17:36

## 2020-02-09 RX ADMIN — VANCOMYCIN HYDROCHLORIDE SCH MLS/HR: 500 INJECTION, POWDER, LYOPHILIZED, FOR SOLUTION INTRAVENOUS at 09:00

## 2020-02-09 RX ADMIN — HEPARIN SODIUM SCH UNITS: 5000 INJECTION INTRAVENOUS; SUBCUTANEOUS at 08:59

## 2020-02-09 RX ADMIN — IPRATROPIUM BROMIDE AND ALBUTEROL SULFATE SCH ML: .5; 3 SOLUTION RESPIRATORY (INHALATION) at 19:00

## 2020-02-09 RX ADMIN — IPRATROPIUM BROMIDE AND ALBUTEROL SULFATE SCH ML: .5; 3 SOLUTION RESPIRATORY (INHALATION) at 01:25

## 2020-02-09 RX ADMIN — METRONIDAZOLE SCH MG: 500 TABLET ORAL at 12:58

## 2020-02-09 RX ADMIN — HEPARIN SODIUM SCH UNITS: 5000 INJECTION INTRAVENOUS; SUBCUTANEOUS at 22:00

## 2020-02-09 NOTE — NUR
NURSE NOTES:

Received report from MICHAEL Field. Patient is in bed, awake and tracks RN with eyes, but 
incomprehensible speech, doesnt understand, Breathing regular and unlabored with no S/S of 
SOB noted, on 2L NC. IV access on the RFA 20G, TKO. Patient is primarily [place]ic speaking, 
Per FLACC, O and no s/sx discomfort noted at this time. Bed is in the lowest position, 
locked and call light is within reach, bed alarm on, pt bedbound. All needs attended to, 
patient remains in stable condition, will continue to monitor

## 2020-02-09 NOTE — PULMONOLOGY PROGRESS NOTE
Assessment/Plan


Assessment/Plan


(1) Lactic acid acidosis


(2) Leukocytosis


(3) Healthcare-associated pneumonia


(4) Diastolic CHF


(5) COPD exacerbation


(6) Alzheimer's dementia


(7) HTN (hypertension)


(8) Hypothyroidism


(9) Failure to thrive


Assessment/Plan


85 F h/o alzheimers dementia, nursing home resident, COPD, mild diastolic CHF, 

HTN, hypothyroidism and recent admission @ [facility] with a COPD exacerbation now p/

w respiratory failure likely 2/2 an acute COPD exacerbation +/- an antecedant 

URI/tracheobronchitis vs early PNA (HAP).  With respect to her volume status 

she has mild DD only and appears slightly fluid overloaded on exam.   The exact 

etiology of her lactic acidosis in unknown but she does not seem to be having a 

rip roaring infection at this time and review of CS-link shows an elevated LA 

in the past.


Assessment/Plan:


Optimize pulmonary hygiene/mobilize as tolerated


BiPAP PRN


Titrate O2 to keep SaO2 > 90%


Dec SM to 30 IV BID 


RTC and PRN HHN's


CXr monday


titrate o2


Levaquin/Flagyl/Vanco


Monitor volumes and renal function, cautious diuresis as tolerated


DVT Px: Hep SQ


Aspiration precautions, SLP recs


Wound care


DNAR





Subjective


ROS Limited/Unobtainable:  Yes


Allergies:  


Coded Allergies:  


     PENICILLINS (Verified  Allergy, Unknown, 2/6/20)


Subjective


no events over night or today


cough and shortness of breath noted


no cp nv or bleeding


on o2





Objective





Last 24 Hour Vital Signs








  Date Time  Temp Pulse Resp B/P (MAP) Pulse Ox O2 Delivery O2 Flow Rate FiO2


 


2/9/20 20:00       2.0 


 


2/9/20 20:00 98.2 75 24 152/72 (98) 95   


 


2/9/20 16:00 96.7 80 19 119/48 (71) 93   


 


2/9/20 16:00       2.0 


 


2/9/20 16:00  98      


 


2/9/20 13:16  77 20  98 Nasal Cannula 2.0 28





  74 20  95   


 


2/9/20 12:00 97.5 81 18 140/86 (104) 93   


 


2/9/20 12:00  79      


 


2/9/20 12:00       2.0 


 


2/9/20 09:00      Nasal Cannula 2.0 


 


2/9/20 08:54  65  109/50    


 


2/9/20 08:15 97.7 65 20 109/50 (69) 92   


 


2/9/20 08:00  79      


 


2/9/20 08:00       2.0 


 


2/9/20 07:38     96 Nasal Cannula 2.0 28


 


2/9/20 07:38  76 20  99 Nasal Cannula 2.0 28





  78 20  96   


 


2/9/20 04:00  77      


 


2/9/20 04:00 97.5 82 19 119/48 (71) 100   


 


2/9/20 04:00       2.0 


 


2/9/20 01:28  65 20  99 Nasal Cannula 2.0 28





  62 20  99   


 


2/9/20 00:00       2.0 


 


2/9/20 00:00  70      


 


2/9/20 00:00 98.1 76 19 125/61 (82) 91   

















Intake and Output  


 


 2/8/20 2/9/20





 19:00 07:00


 


Intake Total 510 ml 


 


Output Total 700 ml 200 ml


 


Balance -190 ml -200 ml


 


  


 


Intake Oral 400 ml 


 


IV Total 110 ml 


 


Output Urine Total 700 ml 200 ml


 


# Voids 1 


 


# Bowel Movements 1 








General Appearance:  WD/WN


HEENT:  atraumatic


Respiratory/Chest:  rhonchi


Cardiovascular:  murmur diastolic, murmur systolic, edema


Abdomen:  non distended, no mass


Neurologic/Psychiatric:  disoriented





Current Medications








 Medications


  (Trade)  Dose


 Ordered  Sig/Patsy


 Route


 PRN Reason  Start Time


 Stop Time Status Last Admin


Dose Admin


 


 Acetaminophen


  (Tylenol)  650 mg  Q4H  PRN


 ORAL


 Mild Pain (Pain Scale 1-3)  2/6/20 13:45


 3/7/20 13:44  2/7/20 17:18


 


 


 Albuterol/


 Ipratropium


  (Albuterol/


 Ipratropium)  3 ml  Q6HRT


 HHN


   2/6/20 19:00


 2/11/20 18:59  2/9/20 13:19


 


 


 Amlodipine


 Besylate


  (Norvasc)  5 mg  DAILY


 ORAL


   2/6/20 16:30


 3/7/20 16:29  2/9/20 08:54


 


 


 Dextrose


  (Dextrose 50%)  25 ml  Q30M  PRN


 IV


 Hypoglycemia  2/6/20 13:45


 3/7/20 13:44   


 


 


 Dextrose


  (Dextrose 50%)  50 ml  Q30M  PRN


 IV


 Hypoglycemia  2/6/20 13:45


 3/7/20 13:44   


 


 


 Furosemide


  (Lasix)  40 mg  DAILY


 ORAL


   2/10/20 09:00


 3/11/20 08:59   


 


 


 Heparin Sodium


  (Porcine)


  (Heparin 5000


 units/ml)  5,000 units  Q12HR


 SUBQ


   2/6/20 21:00


 3/7/20 20:59  2/9/20 22:00


 


 


 Levofloxacin


  (Levaquin)  750 mg  Q48H


 ORAL


   2/10/20 20:00


 2/17/20 19:59   


 


 


 Levothyroxine


 Sodium


  (Synthroid)  100 mcg  DAILY@0630


 ORAL


   2/7/20 06:30


 3/8/20 06:29  2/9/20 06:06


 


 


 Magnesium


 Hydroxide


  (Mom)  30 ml  HSPRN  PRN


 ORAL


 Constipation  2/6/20 16:24


 3/7/20 16:23   


 


 


 Methylprednisolone


 Sodium Succinate


  (Solu-MEDROL)  30 mg  EVERY 12  HOURS


 IVP


   2/7/20 21:00


 3/7/20 20:59  2/9/20 22:00


 


 


 Metronidazole


  (Flagyl)  500 mg  Q6HR


 ORAL


   2/6/20 18:00


 2/13/20 17:59  2/9/20 17:36


 


 


 Vancomycin HCl


  (Vanco rx to


 dose)  1 ea  DAILY  PRN


 MISC


 Per rx protocol  2/6/20 14:45


 3/7/20 14:44   


 


 


 Vancomycin HCl


 500 mg/Sodium


 Chloride  110 ml @ 


 110 mls/hr  Q12HR


 IVPB


   2/6/20 21:00


 2/11/20 20:59  2/9/20 21:59


 

















Niurka Cordon DO Feb 9, 2020 22:04

## 2020-02-09 NOTE — NUR
NURSE NOTES:

pt awake in bed, watching TV, pt will be help to have breakfast soon.  Pt on cardiac monitor 
no signs of cardiac or respiratory distress at this moment.  Call light within reach.  Bed 
is locked and in lowest position.  Will continue to monitor pt and follow plans of care.

## 2020-02-09 NOTE — GENERAL PROGRESS NOTE
Assessment/Plan


Problem List:  


(1) Acute respiratory failure with hypoxia


ICD Codes:  J96.01 - Acute respiratory failure with hypoxia


SNOMED:  14855893, 485299367


(2) HTN (hypertension)


ICD Codes:  I10 - Essential (primary) hypertension


SNOMED:  57247788


(3) Diastolic CHF


ICD Codes:  I50.30 - Unspecified diastolic (congestive) heart failure


SNOMED:  69111965, 470161283


(4) COPD exacerbation


ICD Codes:  J44.1 - Chronic obstructive pulmonary disease with (acute) 

exacerbation


SNOMED:  796399159


(5) Alzheimer's dementia


ICD Codes:  G30.9 - Alzheimer's disease, unspecified


SNOMED:  15308299


(6) Hypothyroidism


ICD Codes:  E03.9 - Hypothyroidism, unspecified


SNOMED:  03865891


Assessment/Plan:


diuresis


abxs


Bronchodilators


IV steroids


Discussed with RN


FREDDY in 1-2 days





Subjective


Allergies:  


Coded Allergies:  


     PENICILLINS (Verified  Allergy, Unknown, 2/6/20)


Subjective


looks better





Objective





Last 24 Hour Vital Signs








  Date Time  Temp Pulse Resp B/P (MAP) Pulse Ox O2 Delivery O2 Flow Rate FiO2


 


2/9/20 13:16  77 20  98 Nasal Cannula 2.0 28





  74 20  95   


 


2/9/20 08:54  65  109/50    


 


2/9/20 08:15 97.7 65 20 109/50 (69) 92   


 


2/9/20 08:00       2.0 


 


2/9/20 07:38     96 Nasal Cannula 2.0 28


 


2/9/20 07:38  76 20  99 Nasal Cannula 2.0 28





  78 20  96   


 


2/9/20 04:00  77      


 


2/9/20 04:00 97.5 82 19 119/48 (71) 100   


 


2/9/20 04:00       2.0 


 


2/9/20 01:28  65 20  99 Nasal Cannula 2.0 28





  62 20  99   


 


2/9/20 00:00       2.0 


 


2/9/20 00:00  70      


 


2/9/20 00:00 98.1 76 19 125/61 (82) 91   


 


2/8/20 21:00      Nasal Cannula 2.0 


 


2/8/20 20:00       2.0 


 


2/8/20 20:00  79      


 


2/8/20 20:00 98.4 89 20 130/52 (78) 96   


 


2/8/20 19:53  47 20  99 Nasal Cannula 2.0 28





  45 20  94   


 


2/8/20 19:51     95 Nasal Cannula 2.0 28


 


2/8/20 16:00 97.8 79 18 116/79 (91) 95   


 


2/8/20 16:00  70      


 


2/8/20 16:00       2.0 

















Intake and Output  


 


 2/8/20 2/9/20





 19:00 07:00


 


Intake Total 510 ml 


 


Output Total 700 ml 200 ml


 


Balance -190 ml -200 ml


 


  


 


Intake Oral 400 ml 


 


IV Total 110 ml 


 


Output Urine Total 700 ml 200 ml


 


# Voids 1 


 


# Bowel Movements 1 








Height (Feet):  5


Height (Inches):  4.00


Weight (Pounds):  185


Cardiovascular:  normal rate


Respiratory/Chest:  lungs clear











Guido Kaplan MD Feb 9, 2020 14:01

## 2020-02-10 VITALS — DIASTOLIC BLOOD PRESSURE: 64 MMHG | SYSTOLIC BLOOD PRESSURE: 153 MMHG

## 2020-02-10 VITALS — SYSTOLIC BLOOD PRESSURE: 130 MMHG | DIASTOLIC BLOOD PRESSURE: 78 MMHG

## 2020-02-10 VITALS — SYSTOLIC BLOOD PRESSURE: 135 MMHG | DIASTOLIC BLOOD PRESSURE: 68 MMHG

## 2020-02-10 VITALS — SYSTOLIC BLOOD PRESSURE: 150 MMHG | DIASTOLIC BLOOD PRESSURE: 68 MMHG

## 2020-02-10 VITALS — SYSTOLIC BLOOD PRESSURE: 120 MMHG | DIASTOLIC BLOOD PRESSURE: 48 MMHG

## 2020-02-10 VITALS — SYSTOLIC BLOOD PRESSURE: 140 MMHG | DIASTOLIC BLOOD PRESSURE: 70 MMHG

## 2020-02-10 LAB
ADD MANUAL DIFF: YES
ANION GAP SERPL CALC-SCNC: 7 MMOL/L (ref 5–15)
BUN SERPL-MCNC: 44 MG/DL (ref 7–18)
CALCIUM SERPL-MCNC: 9.2 MG/DL (ref 8.5–10.1)
CHLORIDE SERPL-SCNC: 113 MMOL/L (ref 98–107)
CO2 SERPL-SCNC: 30 MMOL/L (ref 21–32)
CREAT SERPL-MCNC: 1 MG/DL (ref 0.55–1.3)
ERYTHROCYTE [DISTWIDTH] IN BLOOD BY AUTOMATED COUNT: 12.8 % (ref 11.6–14.8)
HCT VFR BLD CALC: 38.4 % (ref 37–47)
HGB BLD-MCNC: 13.1 G/DL (ref 12–16)
MCV RBC AUTO: 88 FL (ref 80–99)
PLATELET # BLD: 233 K/UL (ref 150–450)
POTASSIUM SERPL-SCNC: 4.1 MMOL/L (ref 3.5–5.1)
RBC # BLD AUTO: 4.37 M/UL (ref 4.2–5.4)
SODIUM SERPL-SCNC: 150 MMOL/L (ref 136–145)
WBC # BLD AUTO: 18.6 K/UL (ref 4.8–10.8)

## 2020-02-10 RX ADMIN — FUROSEMIDE SCH MG: 40 TABLET ORAL at 09:26

## 2020-02-10 RX ADMIN — METRONIDAZOLE SCH MG: 500 TABLET ORAL at 12:30

## 2020-02-10 RX ADMIN — IPRATROPIUM BROMIDE AND ALBUTEROL SULFATE SCH ML: .5; 3 SOLUTION RESPIRATORY (INHALATION) at 01:54

## 2020-02-10 RX ADMIN — HEPARIN SODIUM SCH UNITS: 5000 INJECTION INTRAVENOUS; SUBCUTANEOUS at 09:28

## 2020-02-10 RX ADMIN — METHYLPREDNISOLONE SODIUM SUCCINATE SCH MG: 40 INJECTION, POWDER, LYOPHILIZED, FOR SOLUTION INTRAMUSCULAR; INTRAVENOUS at 09:26

## 2020-02-10 RX ADMIN — IPRATROPIUM BROMIDE AND ALBUTEROL SULFATE SCH ML: .5; 3 SOLUTION RESPIRATORY (INHALATION) at 08:14

## 2020-02-10 RX ADMIN — METRONIDAZOLE SCH MG: 500 TABLET ORAL at 06:53

## 2020-02-10 RX ADMIN — HEPARIN SODIUM SCH UNITS: 5000 INJECTION INTRAVENOUS; SUBCUTANEOUS at 22:10

## 2020-02-10 RX ADMIN — IPRATROPIUM BROMIDE AND ALBUTEROL SULFATE SCH ML: .5; 3 SOLUTION RESPIRATORY (INHALATION) at 15:06

## 2020-02-10 RX ADMIN — VANCOMYCIN HYDROCHLORIDE SCH MLS/HR: 500 INJECTION, POWDER, LYOPHILIZED, FOR SOLUTION INTRAVENOUS at 09:31

## 2020-02-10 RX ADMIN — IPRATROPIUM BROMIDE AND ALBUTEROL SULFATE SCH ML: .5; 3 SOLUTION RESPIRATORY (INHALATION) at 18:51

## 2020-02-10 RX ADMIN — METRONIDAZOLE SCH MG: 500 TABLET ORAL at 00:00

## 2020-02-10 NOTE — NUR
HAND-OFF: 

Report given to MICHAEL Billings.

-------------------------------------------------------------------------------

Addendum: 02/10/20 at 0754 by Shanda Gonsalez RN

-------------------------------------------------------------------------------

Report given to MICHAEL Graham

## 2020-02-10 NOTE — DIAGNOSTIC IMAGING REPORT
Indication: Dyspnea

 

Comparison:  2/6/2020

 

A single view chest radiograph was obtained.

 

Findings:

 

Pulmonary vascular congestion and interstitial edema demonstrated. Cardiomegaly is

present. Bones are osteopenic. Deformity of the right humeral neck again noted.

 

IMPRESSION:

 

CHF

## 2020-02-10 NOTE — NUR
NURSE NOTES:

Received report from MICHAEL Land. Pt in bed, nonverbal, tracts with eye, arousable to 
verbal stimuli, pt is calm, respiration unlabored on 2LNC, no appears of pt, vitals obtained 
by ROBIN Winkler, assessment done, see flowsheet, bed in lowest position, call light within 
reach, IV site left FA 24g occluded and and removed intact.

## 2020-02-10 NOTE — GENERAL PROGRESS NOTE
Assessment/Plan


Problem List:  


(1) Acute respiratory failure with hypoxia


ICD Codes:  J96.01 - Acute respiratory failure with hypoxia


SNOMED:  08766131, 968813234


(2) HTN (hypertension)


ICD Codes:  I10 - Essential (primary) hypertension


SNOMED:  72888312


(3) Diastolic CHF


ICD Codes:  I50.30 - Unspecified diastolic (congestive) heart failure


SNOMED:  22188284, 640704953


(4) COPD exacerbation


ICD Codes:  J44.1 - Chronic obstructive pulmonary disease with (acute) 

exacerbation


SNOMED:  246346348


(5) Alzheimer's dementia


ICD Codes:  G30.9 - Alzheimer's disease, unspecified


SNOMED:  71512498


(6) Hypothyroidism


ICD Codes:  E03.9 - Hypothyroidism, unspecified


SNOMED:  21751029


(7) Hypernatremia


ICD Codes:  E87.0 - Hyperosmolality and hypernatremia


SNOMED:  859885842


Assessment/Plan:


start D5W


abxs


Bronchodilators


taper steroids


follow Labs





Subjective


Allergies:  


Coded Allergies:  


     PENICILLINS (Verified  Allergy, Unknown, 2/6/20)


Subjective


In NAD





Objective





Last 24 Hour Vital Signs








  Date Time  Temp Pulse Resp B/P (MAP) Pulse Ox O2 Delivery O2 Flow Rate FiO2


 


2/10/20 12:00       2.0 


 


2/10/20 09:26  65  135/68    


 


2/10/20 08:14     95 Nasal Cannula 2.0 28


 


2/10/20 08:00       2.0 


 


2/10/20 08:00 96.8 65 18 135/68 (90) 100   


 


2/10/20 07:59  63 22  97 Nasal Cannula 2.0 28





  65 22  96   


 


2/10/20 07:54      Nasal Cannula 2.0 


 


2/10/20 07:39  67      


 


2/10/20 04:06 97.5 67 20 153/64 (93) 94   


 


2/10/20 04:00       2.0 


 


2/10/20 04:00  61      


 


2/10/20 01:55  60 20  98 Nasal Cannula 2.0 28





  55 20  97   


 


2/10/20 00:00 97.9 69 20 150/68 (95) 95   


 


2/10/20 00:00  53      


 


2/10/20 00:00       2.0 


 


2/9/20 21:00      Nasal Cannula 2.0 


 


2/9/20 20:00  85      


 


2/9/20 20:00     95 Nasal Cannula 2.0 28


 


2/9/20 20:00       2.0 


 


2/9/20 20:00 98.2 75 24 152/72 (98) 95   


 


2/9/20 16:00 96.7 80 19 119/48 (71) 93   


 


2/9/20 16:00       2.0 


 


2/9/20 16:00  98      


 


2/9/20 13:16  77 20  98 Nasal Cannula 2.0 28





  74 20  95   

















Intake and Output  


 


 2/9/20 2/10/20





 19:00 07:00


 


Intake Total 340 ml 


 


Output Total 800 ml 600 ml


 


Balance -460 ml -600 ml


 


  


 


Intake Oral 340 ml 


 


Output Urine Total 800 ml 600 ml








Laboratory Tests


2/10/20 06:50: 


White Blood Count 18.6H, Red Blood Count 4.37, Hemoglobin 13.1, Hematocrit 38.4

, Mean Corpuscular Volume 88, Mean Corpuscular Hemoglobin 29.9, Mean 

Corpuscular Hemoglobin Concent 34.1, Red Cell Distribution Width 12.8, Platelet 

Count 233, Mean Platelet Volume 6.8, Neutrophils (%) (Auto) , Lymphocytes (%) (

Auto) , Monocytes (%) (Auto) , Eosinophils (%) (Auto) , Basophils (%) (Auto) , 

Differential Total Cells Counted 100, Neutrophils % (Manual) 84H, Lymphocytes % 

(Manual) 11L, Monocytes % (Manual) 5, Eosinophils % (Manual) 0, Basophils % (

Manual) 0, Band Neutrophils 0, Platelet Estimate Adequate, Platelet Morphology 

Normal, Red Blood Cell Morphology Normal, Sodium Level 150H, Potassium Level 4.1

, Chloride Level 113H, Carbon Dioxide Level 30, Anion Gap 7, Blood Urea 

Nitrogen 44H, Creatinine 1.0, Estimat Glomerular Filtration Rate , Glucose 

Level 188H, Calcium Level 9.2


Height (Feet):  5


Height (Inches):  4.00


Weight (Pounds):  185


Cardiovascular:  normal rate


Respiratory/Chest:  lungs clear


Edema:  no edema noted Generalized











Guido Kaplan MD Feb 10, 2020 12:27

## 2020-02-10 NOTE — NUR
CASE MANAGEMENT:REVIEW



2/10/20

SI: ACUTE RESPIRATORY FAILURE

COPD. CHF

96.8   65  18  135/68  100% ON 2L/NC

WBC+18.6     NA+150   BUN+44  



IS: IV SOLUMEDROL Q12

IV VANCOMYCIN Q12

IVF@75/HR

LEVAQUIN PO Q48

LASIX PO QD

HEPARIN SQ Q12

DUONEB HHN Q6HRS

NORVASC PO QD

**: TELEMETRY STATUS

DCP: FROM [facility]

## 2020-02-10 NOTE — SURGERY PROGRESS NOTE
Surgery Progress Note


Subjective


Additional Comments


no acute events


leukocytosis improving


exam stable





Objective





Last 24 Hour Vital Signs








  Date Time  Temp Pulse Resp B/P (MAP) Pulse Ox O2 Delivery O2 Flow Rate FiO2


 


2/10/20 13:06  66 20  98 Nasal Cannula 2.0 28





  63 20  95   


 


2/10/20 12:04  65      


 


2/10/20 12:00 98.2 81 20 130/78 (95) 98   


 


2/10/20 12:00       2.0 


 


2/10/20 09:26  65  135/68    


 


2/10/20 08:14     95 Nasal Cannula 2.0 28


 


2/10/20 08:00       2.0 


 


2/10/20 08:00 96.8 65 18 135/68 (90) 100   


 


2/10/20 07:59  63 22  97 Nasal Cannula 2.0 28





  65 22  96   


 


2/10/20 07:54      Nasal Cannula 2.0 


 


2/10/20 07:39  67      


 


2/10/20 04:06 97.5 67 20 153/64 (93) 94   


 


2/10/20 04:00       2.0 


 


2/10/20 04:00  61      


 


2/10/20 01:55  60 20  98 Nasal Cannula 2.0 28





  55 20  97   


 


2/10/20 00:00 97.9 69 20 150/68 (95) 95   


 


2/10/20 00:00  53      


 


2/10/20 00:00       2.0 


 


2/9/20 21:00      Nasal Cannula 2.0 


 


2/9/20 20:00  85      


 


2/9/20 20:00     95 Nasal Cannula 2.0 28


 


2/9/20 20:00       2.0 


 


2/9/20 20:00 98.2 75 24 152/72 (98) 95   








I&O











Intake and Output  


 


 2/9/20 2/10/20





 19:00 07:00


 


Intake Total 340 ml 


 


Output Total 800 ml 600 ml


 


Balance -460 ml -600 ml


 


  


 


Intake Oral 340 ml 


 


Output Urine Total 800 ml 600 ml








Dressing:  dry


Wound:  clean


Cardiovascular:  RSR


Respiratory:  clear, decreased breath sounds


Abdomen:  soft, present bowel sounds


Extremities:  no cyanosis





Laboratory Tests








Test


  2/10/20


06:50


 


White Blood Count


  18.6 K/UL


(4.8-10.8)  H


 


Red Blood Count


  4.37 M/UL


(4.20-5.40)


 


Hemoglobin


  13.1 G/DL


(12.0-16.0)


 


Hematocrit


  38.4 %


(37.0-47.0)


 


Mean Corpuscular Volume 88 FL (80-99)  


 


Mean Corpuscular Hemoglobin


  29.9 PG


(27.0-31.0)


 


Mean Corpuscular Hemoglobin


Concent 34.1 G/DL


(32.0-36.0)


 


Red Cell Distribution Width


  12.8 %


(11.6-14.8)


 


Platelet Count


  233 K/UL


(150-450)


 


Mean Platelet Volume


  6.8 FL


(6.5-10.1)


 


Neutrophils (%) (Auto)


  % (45.0-75.0)


 


 


Lymphocytes (%) (Auto)


  % (20.0-45.0)


 


 


Monocytes (%) (Auto)  % (1.0-10.0)  


 


Eosinophils (%) (Auto)  % (0.0-3.0)  


 


Basophils (%) (Auto)  % (0.0-2.0)  


 


Differential Total Cells


Counted 100  


 


 


Neutrophils % (Manual) 84 % (45-75)  H


 


Lymphocytes % (Manual) 11 % (20-45)  L


 


Monocytes % (Manual) 5 % (1-10)  


 


Eosinophils % (Manual) 0 % (0-3)  


 


Basophils % (Manual) 0 % (0-2)  


 


Band Neutrophils 0 % (0-8)  


 


Platelet Estimate Adequate  


 


Platelet Morphology Normal  


 


Red Blood Cell Morphology Normal  


 


Sodium Level


  150 MMOL/L


(136-145)  H


 


Potassium Level


  4.1 MMOL/L


(3.5-5.1)


 


Chloride Level


  113 MMOL/L


()  H


 


Carbon Dioxide Level


  30 MMOL/L


(21-32)


 


Anion Gap


  7 mmol/L


(5-15)


 


Blood Urea Nitrogen


  44 mg/dL


(7-18)  H


 


Creatinine


  1.0 MG/DL


(0.55-1.30)


 


Estimat Glomerular Filtration


Rate  mL/min (>60)  


 


 


Glucose Level


  188 MG/DL


()  H


 


Calcium Level


  9.2 MG/DL


(8.5-10.1)











Plan


Problems:  


(1) Deep tissue injury


Assessment & Plan:  85-year-old female presented with a deep tissue injury in 

the sacrum and left buttock area.  No tissue breakdown.  Erythema somewhat 

blanchable extending from the sacral region to the left buttock.  No drainage 

no fluctuance patient expresses some discomfort upon palpation.  Need to ensure 

improvement and appropriate care as high risk for breaking down/worsening at 

which time care for would be very difficult.


Pt presented on admission with DTPI L upper buttocks(L)2.5cm x (W)2.2cm. Base 

of wound is maroon over Historical scarred area. Non-blanching erythema 

periwound. 


R and L heels are both boggy with non-blanchable erythema. 


No other skin concerns noted.





Tx.plan: 





Apply Moisture Barrier Paste to buttocks. Cover with Optifoam Drsg. Change 

every 3 days and prn.


            


Apply Cavilon Skin Barrier to both heels. Cover each heel with Optifoam drsg. 

Change every 7 days and prn.


            


Reposition at least every 2hours or as tolerated.





Air mattress





Offload heels with pillow





Nutritional optimization





We will follow with recommendations thank you for let me participate in patient'

s care





(2) Leukocytosis


Assessment & Plan:  Leukocytosis and lactic acidosis work-up currently underway 

urine clear receiving IV hydration antibiotics per infectious disease





(3) Lactic acid acidosis


(4) Nursing home resident











Cliff Combs Feb 10, 2020 16:21

## 2020-02-10 NOTE — NUR
***DISCHARGE PLANNING

DISCHARGE PLANNING DISCUSSED WITH DR FRANCINE MONCADA

 FAXED CLINICALS TO [facility]

ANTICIPATE DISCHARGE TOMORROW

## 2020-02-10 NOTE — PULMONOLOGY PROGRESS NOTE
Assessment/Plan


Problems:  


(1) Lactic acid acidosis


(2) Leukocytosis


(3) Healthcare-associated pneumonia


(4) Diastolic CHF


(5) COPD exacerbation


(6) Alzheimer's dementia


(7) HTN (hypertension)


(8) Hypothyroidism


(9) Failure to thrive


Assessment/Plan


85 F h/o alzheimers dementia, nursing home resident, COPD, mild diastolic CHF, 

HTN, hypothyroidism and recent admission @ [facility] with a COPD exacerbation now p/

w respiratory failure likely 2/2 an acute COPD exacerbation +/- an antecedant 

URI/tracheobronchitis vs early PNA (HAP).  With respect to her volume status 

she has mild DD only and appears slightly fluid overloaded on exam.   The exact 

etiology of her lactic acidosis in unknown but she does not seem to be having a 

rip roaring infection at this time and review of CS-link shows an elevated LA 

in the past.


Assessment/Plan:


Optimize pulmonary hygiene/mobilize as tolerated


BiPAP PRN


Titrate O2 to keep SaO2 > 90%


D/C SM, start Pred 30 and taper


RTC and PRN HHN's


S/P Levaquin/Flagyl/Vanco (D5/5) - D/C and observe off Abx


Monitor volumes and renal function, diuresis + free water repletion per Dr. Kaplan


DVT Px: Hep SQ


Aspiration precautions, SLP recs


Wound care


DNAR





Subjective


Allergies:  


Coded Allergies:  


     PENICILLINS (Verified  Allergy, Unknown, 2/6/20)


Subjective


AFVSS O2 needs stable


Per RN no cough or wheezing no distress stevenson PO





Objective





Last 24 Hour Vital Signs








  Date Time  Temp Pulse Resp B/P (MAP) Pulse Ox O2 Delivery O2 Flow Rate FiO2


 


2/10/20 12:00 98.2 81 20 130/78 (95) 98   


 


2/10/20 12:00       2.0 


 


2/10/20 09:26  65  135/68    


 


2/10/20 08:14     95 Nasal Cannula 2.0 28


 


2/10/20 08:00       2.0 


 


2/10/20 08:00 96.8 65 18 135/68 (90) 100   


 


2/10/20 07:59  63 22  97 Nasal Cannula 2.0 28





  65 22  96   


 


2/10/20 07:54      Nasal Cannula 2.0 


 


2/10/20 07:39  67      


 


2/10/20 04:06 97.5 67 20 153/64 (93) 94   


 


2/10/20 04:00       2.0 


 


2/10/20 04:00  61      


 


2/10/20 01:55  60 20  98 Nasal Cannula 2.0 28





  55 20  97   


 


2/10/20 00:00 97.9 69 20 150/68 (95) 95   


 


2/10/20 00:00  53      


 


2/10/20 00:00       2.0 


 


2/9/20 21:00      Nasal Cannula 2.0 


 


2/9/20 20:00  85      


 


2/9/20 20:00     95 Nasal Cannula 2.0 28


 


2/9/20 20:00       2.0 


 


2/9/20 20:00 98.2 75 24 152/72 (98) 95   


 


2/9/20 16:00 96.7 80 19 119/48 (71) 93   


 


2/9/20 16:00       2.0 


 


2/9/20 16:00  98      

















Intake and Output  


 


 2/9/20 2/10/20





 19:00 07:00


 


Intake Total 340 ml 


 


Output Total 800 ml 600 ml


 


Balance -460 ml -600 ml


 


  


 


Intake Oral 340 ml 


 


Output Urine Total 800 ml 600 ml








General Appearance:  no acute distress, cachetic


HEENT:  normocephalic, atraumatic, anicteric, mucous membranes moist


Respiratory/Chest:  chest wall non-tender, lungs clear, normal breath sounds, 

no respiratory distress, no accessory muscle use


Cardiovascular:  normal peripheral pulses, normal rate, regular rhythm


Abdomen:  normal bowel sounds, soft, non tender, no organomegaly, non distended


Extremities:  no cyanosis, no clubbing, no edema


Laboratory Tests


2/10/20 06:50: 


White Blood Count 18.6H, Red Blood Count 4.37, Hemoglobin 13.1, Hematocrit 38.4

, Mean Corpuscular Volume 88, Mean Corpuscular Hemoglobin 29.9, Mean 

Corpuscular Hemoglobin Concent 34.1, Red Cell Distribution Width 12.8, Platelet 

Count 233, Mean Platelet Volume 6.8, Neutrophils (%) (Auto) , Lymphocytes (%) (

Auto) , Monocytes (%) (Auto) , Eosinophils (%) (Auto) , Basophils (%) (Auto) , 

Differential Total Cells Counted 100, Neutrophils % (Manual) 84H, Lymphocytes % 

(Manual) 11L, Monocytes % (Manual) 5, Eosinophils % (Manual) 0, Basophils % (

Manual) 0, Band Neutrophils 0, Platelet Estimate Adequate, Platelet Morphology 

Normal, Red Blood Cell Morphology Normal, Sodium Level 150H, Potassium Level 4.1

, Chloride Level 113H, Carbon Dioxide Level 30, Anion Gap 7, Blood Urea 

Nitrogen 44H, Creatinine 1.0, Estimat Glomerular Filtration Rate , Glucose 

Level 188H, Calcium Level 9.2





Current Medications








 Medications


  (Trade)  Dose


 Ordered  Sig/Patsy


 Route


 PRN Reason  Start Time


 Stop Time Status Last Admin


Dose Admin


 


 Acetaminophen


  (Tylenol)  650 mg  Q4H  PRN


 ORAL


 Mild Pain (Pain Scale 1-3)  2/6/20 13:45


 3/7/20 13:44  2/7/20 17:18


 


 


 Albuterol/


 Ipratropium


  (Albuterol/


 Ipratropium)  3 ml  Q6HRT


 HHN


   2/6/20 19:00


 2/11/20 18:59  2/10/20 08:14


 


 


 Amlodipine


 Besylate


  (Norvasc)  5 mg  DAILY


 ORAL


   2/6/20 16:30


 3/7/20 16:29  2/10/20 09:26


 


 


 Dextrose  1,000 ml @ 


 75 mls/hr  [id number]


 IV


   2/10/20 12:00


 3/11/20 11:59  2/10/20 12:30


 


 


 Dextrose


  (Dextrose 50%)  25 ml  Q30M  PRN


 IV


 Hypoglycemia  2/6/20 13:45


 3/7/20 13:44   


 


 


 Dextrose


  (Dextrose 50%)  50 ml  Q30M  PRN


 IV


 Hypoglycemia  2/6/20 13:45


 3/7/20 13:44   


 


 


 Furosemide


  (Lasix)  40 mg  DAILY


 ORAL


   2/10/20 09:00


 3/11/20 08:59  2/10/20 09:26


 


 


 Heparin Sodium


  (Porcine)


  (Heparin 5000


 units/ml)  5,000 units  Q12HR


 SUBQ


   2/6/20 21:00


 3/7/20 20:59  2/10/20 09:28


 


 


 Levofloxacin


  (Levaquin)  750 mg  Q48H


 ORAL


   2/10/20 20:00


 2/17/20 19:59   


 


 


 Levothyroxine


 Sodium


  (Synthroid)  100 mcg  DAILY@0630


 ORAL


   2/7/20 06:30


 3/8/20 06:29  2/10/20 06:53


 


 


 Magnesium


 Hydroxide


  (Mom)  30 ml  HSPRN  PRN


 ORAL


 Constipation  2/6/20 16:24


 3/7/20 16:23   


 


 


 Methylprednisolone


 Sodium Succinate


  (Solu-MEDROL)  30 mg  EVERY 12  HOURS


 IVP


   2/7/20 21:00


 3/7/20 20:59  2/10/20 09:26


 


 


 Metronidazole


  (Flagyl)  500 mg  Q6HR


 ORAL


   2/6/20 18:00


 2/13/20 17:59  2/10/20 12:30


 


 


 Vancomycin HCl


  (Vanco rx to


 dose)  1 ea  DAILY  PRN


 MISC


 Per rx protocol  2/6/20 14:45


 3/7/20 14:44   


 


 


 Vancomycin HCl


 500 mg/Sodium


 Chloride  110 ml @ 


 110 mls/hr  Q12HR


 IVPB


   2/6/20 21:00


 2/11/20 20:59  2/10/20 09:31


 

















Morgan Billy MD Feb 10, 2020 13:42

## 2020-02-11 VITALS — SYSTOLIC BLOOD PRESSURE: 124 MMHG | DIASTOLIC BLOOD PRESSURE: 53 MMHG

## 2020-02-11 VITALS — SYSTOLIC BLOOD PRESSURE: 112 MMHG | DIASTOLIC BLOOD PRESSURE: 56 MMHG

## 2020-02-11 VITALS — DIASTOLIC BLOOD PRESSURE: 69 MMHG | SYSTOLIC BLOOD PRESSURE: 119 MMHG

## 2020-02-11 VITALS — DIASTOLIC BLOOD PRESSURE: 57 MMHG | SYSTOLIC BLOOD PRESSURE: 132 MMHG

## 2020-02-11 VITALS — DIASTOLIC BLOOD PRESSURE: 75 MMHG | SYSTOLIC BLOOD PRESSURE: 123 MMHG

## 2020-02-11 LAB
ADD MANUAL DIFF: YES
ANION GAP SERPL CALC-SCNC: 8 MMOL/L (ref 5–15)
BUN SERPL-MCNC: 39 MG/DL (ref 7–18)
CALCIUM SERPL-MCNC: 8.9 MG/DL (ref 8.5–10.1)
CHLORIDE SERPL-SCNC: 106 MMOL/L (ref 98–107)
CO2 SERPL-SCNC: 30 MMOL/L (ref 21–32)
CREAT SERPL-MCNC: 0.8 MG/DL (ref 0.55–1.3)
ERYTHROCYTE [DISTWIDTH] IN BLOOD BY AUTOMATED COUNT: 12.7 % (ref 11.6–14.8)
HCT VFR BLD CALC: 36.6 % (ref 37–47)
HGB BLD-MCNC: 12.4 G/DL (ref 12–16)
MCV RBC AUTO: 87 FL (ref 80–99)
PLATELET # BLD: 212 K/UL (ref 150–450)
POTASSIUM SERPL-SCNC: 3.6 MMOL/L (ref 3.5–5.1)
RBC # BLD AUTO: 4.19 M/UL (ref 4.2–5.4)
SODIUM SERPL-SCNC: 144 MMOL/L (ref 136–145)
WBC # BLD AUTO: 19.9 K/UL (ref 4.8–10.8)

## 2020-02-11 RX ADMIN — IPRATROPIUM BROMIDE AND ALBUTEROL SULFATE SCH ML: .5; 3 SOLUTION RESPIRATORY (INHALATION) at 13:42

## 2020-02-11 RX ADMIN — IPRATROPIUM BROMIDE AND ALBUTEROL SULFATE SCH ML: .5; 3 SOLUTION RESPIRATORY (INHALATION) at 02:00

## 2020-02-11 RX ADMIN — HEPARIN SODIUM SCH UNITS: 5000 INJECTION INTRAVENOUS; SUBCUTANEOUS at 08:35

## 2020-02-11 RX ADMIN — FUROSEMIDE SCH MG: 40 TABLET ORAL at 08:34

## 2020-02-11 RX ADMIN — IPRATROPIUM BROMIDE AND ALBUTEROL SULFATE SCH ML: .5; 3 SOLUTION RESPIRATORY (INHALATION) at 08:18

## 2020-02-11 NOTE — GENERAL PROGRESS NOTE
Assessment/Plan


Problem List:  


(1) Acute respiratory failure with hypoxia


ICD Codes:  J96.01 - Acute respiratory failure with hypoxia


SNOMED:  76516887, 078269555


(2) HTN (hypertension)


ICD Codes:  I10 - Essential (primary) hypertension


SNOMED:  35575604


(3) Diastolic CHF


ICD Codes:  I50.30 - Unspecified diastolic (congestive) heart failure


SNOMED:  39883781, 152404015


(4) COPD exacerbation


ICD Codes:  J44.1 - Chronic obstructive pulmonary disease with (acute) 

exacerbation


SNOMED:  360964516


(5) Alzheimer's dementia


ICD Codes:  G30.9 - Alzheimer's disease, unspecified


SNOMED:  02706377


(6) Hypothyroidism


ICD Codes:  E03.9 - Hypothyroidism, unspecified


SNOMED:  57847624


(7) Hypernatremia


ICD Codes:  E87.0 - Hyperosmolality and hypernatremia


SNOMED:  635597381


Assessment/Plan:


Dc IVF


Dc today





Subjective


Allergies:  


Coded Allergies:  


     PENICILLINS (Verified  Allergy, Unknown, 2/6/20)


Subjective


In NAD





Objective





Last 24 Hour Vital Signs








  Date Time  Temp Pulse Resp B/P (MAP) Pulse Ox O2 Delivery O2 Flow Rate FiO2


 


2/11/20 13:43  64 18  98 Nasal Cannula 1.0 24





  65 18  95   


 


2/11/20 12:28  66      


 


2/11/20 12:00       1.0 


 


2/11/20 11:58 97.3 66 18 123/75 (91) 94   


 


2/11/20 09:00      Nasal Cannula 1.0 


 


2/11/20 08:33  61  132/57    


 


2/11/20 08:18  61 18  98 Nasal Cannula 1.0 24





  56 20  96   


 


2/11/20 08:18     96 Nasal Cannula 2.0 28


 


2/11/20 08:00       1.0 


 


2/11/20 08:00 98.5 56 18 132/57 (82) 96   


 


2/11/20 07:46  56      


 


2/11/20 04:00       1.0 


 


2/11/20 04:00 97.9 80 20 112/56 (74) 93   


 


2/11/20 04:00  60      


 


2/11/20 02:10  53 20  99 Nasal Cannula 2.0 28


 


2/11/20 02:00  54 20  97 Nasal Cannula 2.0 28


 


2/11/20 00:02  46      


 


2/11/20 00:00 98.4 71 20 119/69 (86) 91   


 


2/10/20 21:04      Nasal Cannula 1.0 


 


2/10/20 20:00       1.0 


 


2/10/20 20:00  68      


 


2/10/20 20:00 97.7 74 20 120/48 (72) 95   


 


2/10/20 18:51     96 Nasal Cannula 2.0 28


 


2/10/20 16:00       2.0 


 


2/10/20 16:00 98.1 63 18 140/70 (93) 91   


 


2/10/20 15:28  73      

















Intake and Output  


 


 2/10/20 2/11/20





 19:00 07:00


 


Intake Total 680 ml 


 


Output Total 500 ml 10 ml


 


Balance 180 ml -10 ml


 


  


 


Intake Oral 680 ml 


 


Output Urine Total 500 ml 10 ml


 


# Bowel Movements 1 








Laboratory Tests


2/11/20 06:20: 


White Blood Count 19.9H, Red Blood Count 4.19L, Hemoglobin 12.4, Hematocrit 

36.6L, Mean Corpuscular Volume 87, Mean Corpuscular Hemoglobin 29.7, Mean 

Corpuscular Hemoglobin Concent 33.9, Red Cell Distribution Width 12.7, Platelet 

Count 212, Mean Platelet Volume 6.5, Neutrophils (%) (Auto) , Lymphocytes (%) (

Auto) , Monocytes (%) (Auto) , Eosinophils (%) (Auto) , Basophils (%) (Auto) , 

Differential Total Cells Counted 100, Neutrophils % (Manual) 79H, Lymphocytes % 

(Manual) 15L, Monocytes % (Manual) 6, Eosinophils % (Manual) 0, Basophils % (

Manual) 0, Band Neutrophils 0, Platelet Estimate Adequate, Platelet Morphology 

Normal, Poikilocytosis , Anisocytosis , Sodium Level 144, Potassium Level 3.6, 

Chloride Level 106, Carbon Dioxide Level 30, Anion Gap 8, Blood Urea Nitrogen 

39H, Creatinine 0.8, Estimat Glomerular Filtration Rate , Glucose Level 174H, 

Calcium Level 8.9


Height (Feet):  5


Height (Inches):  4.00


Weight (Pounds):  185


Cardiovascular:  normal rate


Respiratory/Chest:  lungs clear


Edema:  no edema noted Generalized











Guido Kaplan MD Feb 11, 2020 14:32

## 2020-02-11 NOTE — PULMONOLOGY PROGRESS NOTE
Assessment/Plan


Problems:  


(1) Lactic acid acidosis


(2) Leukocytosis


(3) Healthcare-associated pneumonia


(4) Diastolic CHF


(5) COPD exacerbation


(6) Alzheimer's dementia


(7) HTN (hypertension)


(8) Hypothyroidism


(9) Failure to thrive


Assessment/Plan


85 F h/o alzheimers dementia, nursing home resident, COPD, mild diastolic CHF, 

HTN, hypothyroidism and recent admission @ [facility] with a COPD exacerbation now p/

w respiratory failure likely 2/2 an acute COPD exacerbation +/- an antecedant 

URI/tracheobronchitis vs early PNA (HAP).  With respect to her volume status 

she has mild DD only and appears slightly fluid overloaded on exam.   The exact 

etiology of her lactic acidosis in unknown but she does not seem to be having a 

rip roaring infection at this time and review of CS-link shows an elevated LA 

in the past.





Assessment/Plan:


Optimize pulmonary hygiene/mobilize as tolerated


BiPAP PRN


Titrate O2 to keep SaO2 > 90%


Pred 30 --> D/C on MDP


RTC and PRN HHN's


Observe off Abx


Monitor volumes and renal function


DVT Px: Hep SQ


Aspiration precautions, SLP recs


Wound care


DNAR





Subjective


Allergies:  


Coded Allergies:  


     PENICILLINS (Verified  Allergy, Unknown, 2/6/20)


Subjective


AFVSS O2 needs stable


Per RN no cough or wheezing no distress stevenson PO





Objective





Last 24 Hour Vital Signs








  Date Time  Temp Pulse Resp B/P (MAP) Pulse Ox O2 Delivery O2 Flow Rate FiO2


 


2/11/20 15:47 97.3 78 18 124/53 (76) 94   


 


2/11/20 13:43  64 18  98 Nasal Cannula 1.0 24





  65 18  95   


 


2/11/20 12:28  66      


 


2/11/20 12:00       1.0 


 


2/11/20 11:58 97.3 66 18 123/75 (91) 94   


 


2/11/20 09:00      Nasal Cannula 1.0 


 


2/11/20 08:33  61  132/57    


 


2/11/20 08:18  61 18  98 Nasal Cannula 1.0 24





  56 20  96   


 


2/11/20 08:18     96 Nasal Cannula 2.0 28


 


2/11/20 08:00       1.0 


 


2/11/20 08:00 98.5 56 18 132/57 (82) 96   


 


2/11/20 07:46  56      


 


2/11/20 04:00       1.0 


 


2/11/20 04:00 97.9 80 20 112/56 (74) 93   


 


2/11/20 04:00  60      


 


2/11/20 02:10  53 20  99 Nasal Cannula 2.0 28


 


2/11/20 02:00  54 20  97 Nasal Cannula 2.0 28


 


2/11/20 00:02  46      


 


2/11/20 00:00 98.4 71 20 119/69 (86) 91   


 


2/10/20 21:04      Nasal Cannula 1.0 


 


2/10/20 20:00       1.0 


 


2/10/20 20:00  68      


 


2/10/20 20:00 97.7 74 20 120/48 (72) 95   


 


2/10/20 18:51     96 Nasal Cannula 2.0 28

















Intake and Output  


 


 2/10/20 2/11/20





 19:00 07:00


 


Intake Total 680 ml 


 


Output Total 500 ml 10 ml


 


Balance 180 ml -10 ml


 


  


 


Intake Oral 680 ml 


 


Output Urine Total 500 ml 10 ml


 


# Bowel Movements 1 








General Appearance:  no acute distress, cachetic


HEENT:  normocephalic, atraumatic, anicteric, mucous membranes moist


Respiratory/Chest:  chest wall non-tender, lungs clear, normal breath sounds, 

no respiratory distress, no accessory muscle use


Cardiovascular:  normal peripheral pulses, normal rate, regular rhythm


Abdomen:  normal bowel sounds, soft, non tender, no organomegaly, non distended

, no mass


Extremities:  no cyanosis, no clubbing, no edema


Laboratory Tests


2/11/20 06:20: 


White Blood Count 19.9H, Red Blood Count 4.19L, Hemoglobin 12.4, Hematocrit 

36.6L, Mean Corpuscular Volume 87, Mean Corpuscular Hemoglobin 29.7, Mean 

Corpuscular Hemoglobin Concent 33.9, Red Cell Distribution Width 12.7, Platelet 

Count 212, Mean Platelet Volume 6.5, Neutrophils (%) (Auto) , Lymphocytes (%) (

Auto) , Monocytes (%) (Auto) , Eosinophils (%) (Auto) , Basophils (%) (Auto) , 

Differential Total Cells Counted 100, Neutrophils % (Manual) 79H, Lymphocytes % 

(Manual) 15L, Monocytes % (Manual) 6, Eosinophils % (Manual) 0, Basophils % (

Manual) 0, Band Neutrophils 0, Platelet Estimate Adequate, Platelet Morphology 

Normal, Poikilocytosis , Anisocytosis , Sodium Level 144, Potassium Level 3.6, 

Chloride Level 106, Carbon Dioxide Level 30, Anion Gap 8, Blood Urea Nitrogen 

39H, Creatinine 0.8, Estimat Glomerular Filtration Rate , Glucose Level 174H, 

Calcium Level 8.9





Current Medications








 Medications


  (Trade)  Dose


 Ordered  Sig/Patsy


 Route


 PRN Reason  Start Time


 Stop Time Status Last Admin


Dose Admin


 


 Acetaminophen


  (Tylenol)  650 mg  Q4H  PRN


 ORAL


 Mild Pain (Pain Scale 1-3)  2/6/20 13:45


 3/7/20 13:44  2/7/20 17:18


 


 


 Albuterol/


 Ipratropium


  (Albuterol/


 Ipratropium)  3 ml  Q6HRT


 HHN


   2/6/20 19:00


 2/11/20 18:59  2/11/20 13:42


 


 


 Amlodipine


 Besylate


  (Norvasc)  5 mg  DAILY


 ORAL


   2/6/20 16:30


 3/7/20 16:29  2/11/20 08:33


 


 


 Dextrose


  (Dextrose 50%)  25 ml  Q30M  PRN


 IV


 Hypoglycemia  2/6/20 13:45


 3/7/20 13:44   


 


 


 Dextrose


  (Dextrose 50%)  50 ml  Q30M  PRN


 IV


 Hypoglycemia  2/6/20 13:45


 3/7/20 13:44   


 


 


 Furosemide


  (Lasix)  40 mg  DAILY


 ORAL


   2/10/20 09:00


 3/11/20 08:59  2/11/20 08:34


 


 


 Heparin Sodium


  (Porcine)


  (Heparin 5000


 units/ml)  5,000 units  Q12HR


 SUBQ


   2/6/20 21:00


 3/7/20 20:59  2/11/20 08:35


 


 


 Levofloxacin


  (Levaquin)  750 mg  Q48H


 ORAL


   2/10/20 20:00


 2/17/20 19:59  2/10/20 22:12


 


 


 Levothyroxine


 Sodium


  (Synthroid)  100 mcg  DAILY@0630


 ORAL


   2/7/20 06:30


 3/8/20 06:29  2/11/20 06:05


 


 


 Magnesium


 Hydroxide


  (Mom)  30 ml  HSPRN  PRN


 ORAL


 Constipation  2/6/20 16:24


 3/7/20 16:23   


 


 


 Prednisone


  (predniSONE)  30 mg  DAILY


 ORAL


   2/11/20 09:00


 3/12/20 08:59  2/11/20 08:33


 

















Morgan Billy MD Feb 11, 2020 17:29

## 2020-02-11 NOTE — CDS PHYSICIAN QUERY
Clarification is required for compliance, coding accuracy, and to reflect 
severity of illness for this patient



Dear Guido Santiago MD                              Date:  2/11/2020



CDS: Antonino Hernandezdonasukhwinder



This is an 85-year-old Mexican female who was admitted for

acute respiratory failure, hypoxic with wheezing, possible COPD

exacerbation.  There may be a component of CHF. She also has lactic

acidosis.  Sepsis needs to be ruled out although her blood pressure is

normal.



WBC: 17.7--->26.5



Tx: Vancomycin



According to the clinical indications above, please indicate below the condition



PHYSICIAN RESPONSE:



 Sepsis         

 SIRS      

 SIRS with organ dysfunction

 Septic Shock       

 Not applicable         

 Other:                      

 

 

 

Present on Admission:    Yes            No           Clinically Undetermined





_________________                                    _____________

Physician signature                                       Date



Please also document in your Progress Notes and/or Discharge Summary and 
indicate if the condition was present on admission.

MTDD

## 2020-02-11 NOTE — NUR
NURSE NOTES:

Received pt from [facility] ROD[facility], Pt is awake and alert, pt has NC 2LMP. Pt has intact iv access 
RH 22G is running well. No complain of pain at this moment. Pt is on continues heart 
monitoring. all needs attended, bed is locked and is in the lowest position, call light 
within easy reach. will continue to monitor.

## 2020-02-11 NOTE — SURGERY PROGRESS NOTE
Surgery Progress Note


Subjective


Additional Comments


no acute events


leukocytosis


no n/v/f/c





Objective





Last 24 Hour Vital Signs








  Date Time  Temp Pulse Resp B/P (MAP) Pulse Ox O2 Delivery O2 Flow Rate FiO2


 


2/11/20 13:43  64 18  98 Nasal Cannula 1.0 24





  65 18  95   


 


2/11/20 12:28  66      


 


2/11/20 12:00       1.0 


 


2/11/20 11:58 97.3 66 18 123/75 (91) 94   


 


2/11/20 09:00      Nasal Cannula 1.0 


 


2/11/20 08:33  61  132/57    


 


2/11/20 08:18  61 18  98 Nasal Cannula 1.0 24





  56 20  96   


 


2/11/20 08:18     96 Nasal Cannula 2.0 28


 


2/11/20 08:00       1.0 


 


2/11/20 08:00 98.5 56 18 132/57 (82) 96   


 


2/11/20 07:46  56      


 


2/11/20 04:00       1.0 


 


2/11/20 04:00 97.9 80 20 112/56 (74) 93   


 


2/11/20 04:00  60      


 


2/11/20 02:10  53 20  99 Nasal Cannula 2.0 28


 


2/11/20 02:00  54 20  97 Nasal Cannula 2.0 28


 


2/11/20 00:02  46      


 


2/11/20 00:00 98.4 71 20 119/69 (86) 91   


 


2/10/20 21:04      Nasal Cannula 1.0 


 


2/10/20 20:00       1.0 


 


2/10/20 20:00  68      


 


2/10/20 20:00 97.7 74 20 120/48 (72) 95   


 


2/10/20 18:51     96 Nasal Cannula 2.0 28


 


2/10/20 16:00       2.0 


 


2/10/20 16:00 98.1 63 18 140/70 (93) 91   


 


2/10/20 15:28  73      








I&O











Intake and Output  


 


 2/10/20 2/11/20





 19:00 07:00


 


Intake Total 680 ml 


 


Output Total 500 ml 10 ml


 


Balance 180 ml -10 ml


 


  


 


Intake Oral 680 ml 


 


Output Urine Total 500 ml 10 ml


 


# Bowel Movements 1 








Dressing:  other


Wound:  other


Drains:  other


Cardiovascular:  RSR


Respiratory:  decreased breath sounds


Abdomen:  soft, present bowel sounds


Extremities:  no edema, no cyanosis





Laboratory Tests








Test


  2/11/20


06:20


 


White Blood Count


  19.9 K/UL


(4.8-10.8)  H


 


Red Blood Count


  4.19 M/UL


(4.20-5.40)  L


 


Hemoglobin


  12.4 G/DL


(12.0-16.0)


 


Hematocrit


  36.6 %


(37.0-47.0)  L


 


Mean Corpuscular Volume 87 FL (80-99)  


 


Mean Corpuscular Hemoglobin


  29.7 PG


(27.0-31.0)


 


Mean Corpuscular Hemoglobin


Concent 33.9 G/DL


(32.0-36.0)


 


Red Cell Distribution Width


  12.7 %


(11.6-14.8)


 


Platelet Count


  212 K/UL


(150-450)


 


Mean Platelet Volume


  6.5 FL


(6.5-10.1)


 


Neutrophils (%) (Auto)


  % (45.0-75.0)


 


 


Lymphocytes (%) (Auto)


  % (20.0-45.0)


 


 


Monocytes (%) (Auto)  % (1.0-10.0)  


 


Eosinophils (%) (Auto)  % (0.0-3.0)  


 


Basophils (%) (Auto)  % (0.0-2.0)  


 


Differential Total Cells


Counted 100  


 


 


Neutrophils % (Manual) 79 % (45-75)  H


 


Lymphocytes % (Manual) 15 % (20-45)  L


 


Monocytes % (Manual) 6 % (1-10)  


 


Eosinophils % (Manual) 0 % (0-3)  


 


Basophils % (Manual) 0 % (0-2)  


 


Band Neutrophils 0 % (0-8)  


 


Platelet Estimate Adequate  


 


Platelet Morphology Normal  


 


Poikilocytosis   


 


Anisocytosis   


 


Sodium Level


  144 MMOL/L


(136-145)


 


Potassium Level


  3.6 MMOL/L


(3.5-5.1)


 


Chloride Level


  106 MMOL/L


()


 


Carbon Dioxide Level


  30 MMOL/L


(21-32)


 


Anion Gap


  8 mmol/L


(5-15)


 


Blood Urea Nitrogen


  39 mg/dL


(7-18)  H


 


Creatinine


  0.8 MG/DL


(0.55-1.30)


 


Estimat Glomerular Filtration


Rate  mL/min (>60)  


 


 


Glucose Level


  174 MG/DL


()  H


 


Calcium Level


  8.9 MG/DL


(8.5-10.1)











Plan


Problems:  


(1) Deep tissue injury


Assessment & Plan:  85-year-old female presented with a deep tissue injury in 

the sacrum and left buttock area.  No tissue breakdown.  Erythema somewhat 

blanchable extending from the sacral region to the left buttock.  No drainage 

no fluctuance patient expresses some discomfort upon palpation.  Need to ensure 

improvement and appropriate care as high risk for breaking down/worsening at 

which time care for would be very difficult.


Pt presented on admission with DTPI L upper buttocks(L)2.5cm x (W)2.2cm. Base 

of wound is maroon over Historical scarred area. Non-blanching erythema 

periwound. 


R and L heels are both boggy with non-blanchable erythema. 


No other skin concerns noted.





Tx.plan: 





Apply Moisture Barrier Paste to buttocks. Cover with Optifoam Drsg. Change 

every 3 days and prn.


            


Apply Cavilon Skin Barrier to both heels. Cover each heel with Optifoam drsg. 

Change every 7 days and prn.


            


Reposition at least every 2hours or as tolerated.





Air mattress





Offload heels with pillow





Nutritional optimization





We will follow with recommendations thank you for let me participate in patient'

s care





(2) Leukocytosis


Assessment & Plan:  Leukocytosis and lactic acidosis work-up currently underway 

urine clear receiving IV hydration antibiotics per infectious disease





(3) Lactic acid acidosis


(4) Nursing home resident











Cliff Combs Feb 11, 2020 14:05

## 2020-02-11 NOTE — NUR
RD ASSESSMENT & RECOMMENDATIONS

SEE CARE ACTIVITY FOR COMPLETE ASSESSMENT



DAILY ESTIMATED NEEDS:

Needs based on Wounds, Cardiac/ 62kg abw 

25-30  kcals/kg 

8300-6398  total kcals

1.25-1.5  g protein/kg

77-93  g total protein 

20-22  mL/kg

7615-5560  total fluid mLs



NUTRITION DIAGNOSIS:

* Increased protein intake needs R/T wound healing as evidenced by pt

admitted w/ DTPI @ L upper buttocks and non-blanchable erythema @ BL heels

* Swallowing difficulty R/T dysphagia as evidenced by pt on pureed moist

texture w/ NTL.

* Altered nutrition related lab values R/T Pre-DM w/ hyperglycemia, on

steroids, CHF as evidenced by A1C of 5.8, elev BGs (174 188 175), pt on

Prednisone, elev 







CURRENT DIET:REGULAR, pureed moist w/ NTL    



 



PO DIET RECOMMENDATIONS:

LOW NA, CCHO MED/ texture per SLP  



 



ADDITIONAL RECOMMENDATIONS:

* Calibrated daily bedscale wts: on added p200 mattress + pump 

   daily wts for CHF dx, pt on Lasix 

* Monitor lytes closely w/ Lasix, replete as needed 

* Rec NISS while pt on Prednisone: elev BG values 

* Wound healing: add MVI x 1, Vit C 250mg QD 

                        Henri BID 

* SLP eval for appropriate texture

## 2020-02-11 NOTE — NUR
NURSE NOTES:

Pt has D/C order, all D/C assessments and instructions done, pt is stable, V/S stable, no 
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 
pick pt up. will continue to monitor.

## 2020-02-11 NOTE — NUR
NURSE NOTES:

Dr CRISTOFER MONCADA notified about WBC 19.9 and other lab results and V/S, ordered to D/C iv fluid, 
noted and carried out. will continue to monitor.

## 2020-02-11 NOTE — NUR
NURSE NOTES:

Pt was sent with [facility] and was taken by 2 EMTs to return to [facility]. Pt IV 
was removed and is in stable condition. Sent on room air and satting at 93%, VSS. All 
belongings sent.

## 2020-02-11 NOTE — NUR
****DISCHARGE PLANNED

DISCHARGE ORDER NOTED



PATIENT IS RETURNING TO

[facility]

ROOM [address]

SKILLED

T: [phone] FOR NURSE TO NURSE REPORT

[facility] HAS BEEN ARRANGED FOR 1730 

SPOKE WITH DAUGHTER, TRI. SHE IS IN AGREEMENT WITH DISCHARGE PLAN

## 2020-02-13 NOTE — DISCHARGE SUMMARY
Discharge Summary


Discharge Summary


_


DATE OF ADMISSION: 2/6/2020





DATE OF DISCHARGE:   2/11/2020








DISCHARGED BY: Dr. Kaplan





REASON FOR ADMISSION: 


85 years old female with past medical history of COPD, CHF, hypertension, 

dyslipidemia, hypothyroidism, dementia, resident of skilled nursing facility, 

was transferred for evaluation due to tachypnea ,wheezing and shortness of 

breath.  


Vital signs revealed tachycardia ,tachypnea and hypoxia .


patient required supplemental oxygen.  


After short evaluation in emergency department patient was diagnosed with   

lactic   acidosis and COPD exacerbation and admitted for further management.  


Laboratory work-up revealed WBC 17.1, hemoglobin 13.4, hematocrit 39.9, 

platelet count 180.  


Stable electrolytes and renal parameters.  


Lactic acid 4.4, repeated 5.2.  


Stable LFT.  Troponin 0.053.  


Albumin 3.2.  


Urinalysis revealed no evidence of urinary tract infection .


EKG revealed sinus tachycardia , no acute ischemic changes.  


Chest x-ray revealed no acute cardiopulmonary pathology.  Suspected CHF.


Per POLST  patient with  DNR/DNI status with selective treatment.





CONSULTANTS:


pulmonary Dr. Billy


surgery Dr. Combs


 


 


[facility] COURSE: 


Patient admitted to telemetry floor.  


Supplemental oxygen provided and titrated to keep pulse oximetry above 90%.  


Nebulizing treatment with bronchodilator provided around-the-clock and as 

needed.  


BiPAP was on board as needed.  


Patient was on steroids with gradual tapering as per pulmonologist 

recommendation.  


Patient was discharged  on Medrol Dosepak.  


Patient initially was on   empiric antibiotics.  


DVT prophylaxis provided.  


Aspiration precaution maintained.  


Follow-up chest x-ray revealed congestive heart failure.  


Patient remained afebrile . Patient   develop leukocytosis which was  was 

trending down , likely due to steroids ; no evidence of infection.


Volumes were closely monitored.  


Patient was on oral diuretic with close monitoring of volumes and renal 

parameters.  


Electrolytes corrected as needed.  


Potassium replaced.  


Blood pressure was managed with  calcium channel blocker and Lasix , and 

remained stable.  


Tachycardia resolved.  


Prior to discharge pulse oximetry stable on oxygen 2 L via nasal cannula.  





Surgeon seen the patient for deep tissue injury in the sacral and left buttock 

area.  


No tissue breakdown.  


Wound care provided as per surgeon recommendation .


Continue wound care at the facility.


TSH within normal limits.  


Current dose of levothyroxine was continued.  


Hypernatremia resolved.  





Patient clinically stabilized and was ready for transfer back to skilled 

nursing [facility] for continuation of care





FINAL DIAGNOSES: 


Acute respiratory failure with hypoxia-resolved 


Hypertension


Diastolic CHF


COPD exacerbation


Alzheimer dementia


Hypothyroidism


Hypernatremia


Deep tissue injury present on admission, sacral and left buttock area, no 

tissue breakdown.





DISCHARGE MEDICATIONS:


See Medication Reconciliation list.





DISCHARGE INSTRUCTIONS:


Patient was discharged to the skilled nursing facility. 


Follow up with medical doctor at the facility.  








I have been assigned to dictate discharge summary for this account. I was not 

involved in the patient's management.











Lesia De La Cruz NP Feb 13, 2020 09:14

## 2022-09-19 NOTE — HISTORY AND PHYSICAL REPORT
DATE OF ADMISSION:  02/06/2020

CHIEF COMPLAINT:  Shortness of breath.



HISTORY OF PRESENT ILLNESS:  This is an 85-year-old French female with

history of dementia who is unable to provide any history.  The patient

lives in [facility] under my care.  Nurse called me as the

patient was very tachypneic, wheezing, and short of breath.  The patient

was sent to the emergency room for evaluation and was started on BiPAP and

she is going to be admitted for acute respiratory failure.



PAST MEDICAL HISTORY:  Includes history of COPD, CHF, hypertension,

hyperthyroidism, history of dyslipidemia.



SOCIAL HISTORY:  No history of smoking or alcohol abuse.  The patient lives

in [place].



ALLERGIES:  Penicillin.



REVIEW OF SYSTEMS:  Unobtainable.



PHYSICAL EXAMINATION:

GENERAL:  The patient is an elderly female.  She is nonverbal.

VITAL SIGNS:  Blood pressure 148/80, pulse 70, temperature 97.7,

respiratory rate 18.

HEENT:  Somewhat pale conjunctivae.  Anicteric sclerae.

NECK:  Supple.

LUNGS:  Bilateral expiratory wheezing.

HEART:  S1 and S2 tachycardic.

ABDOMEN:  Soft, nontender.

EXTREMITIES:  Bilateral pedal edema.



LABORATORY FINDINGS:  CBC shows WBC of 17,100, hematocrit 39.9, hemoglobin

is 13.4, and platelet is 180,000.  Chemistry panel shows sodium 145,

potassium 3.8, chloride 104, CO2 32, BUN 20, creatinine 0.9, blood sugar

is 149.  Lactic acid 4.4. UA was negative.



ASSESSMENT:  This is an 85-year-old French female who was admitted for

acute respiratory failure, hypoxic with wheezing, possible COPD

exacerbation.  There may be a component of CHF. She also has lactic

acidosis.  Sepsis needs to be ruled out although her blood pressure is

normal.



PLAN:  The patient will be on bronchodilators, IV steroids, and

antibiotics.  BiPAP as needed.  Pulmonary consultation was obtained.  I

will also get cardiologist involved.  Labs will be followed and further

adjustment will be made in the patient's regimen.



Thank you very much.









  ______________________________________________

  Guido Kaplan M.D.





DR:  Bhanu

D:  02/06/2020 14:29

T:  02/06/2020 17:54

JOB#:  [id number]

CC:
Not applicable
